# Patient Record
Sex: MALE | Race: WHITE | NOT HISPANIC OR LATINO | Employment: OTHER | URBAN - NONMETROPOLITAN AREA
[De-identification: names, ages, dates, MRNs, and addresses within clinical notes are randomized per-mention and may not be internally consistent; named-entity substitution may affect disease eponyms.]

---

## 2017-01-18 ENCOUNTER — TELEPHONE (OUTPATIENT)
Dept: CARDIOLOGY | Facility: CLINIC | Age: 77
End: 2017-01-18

## 2017-01-18 RX ORDER — CARVEDILOL 6.25 MG/1
TABLET ORAL
Qty: 60 TABLET | Refills: 0 | Status: SHIPPED | OUTPATIENT
Start: 2017-01-18 | End: 2017-02-17 | Stop reason: SDUPTHER

## 2017-01-18 RX ORDER — ATORVASTATIN CALCIUM 10 MG/1
TABLET, FILM COATED ORAL
Qty: 30 TABLET | Refills: 0 | Status: SHIPPED | OUTPATIENT
Start: 2017-01-18 | End: 2017-02-17 | Stop reason: SDUPTHER

## 2017-01-18 RX ORDER — PRASUGREL HCL 10 MG
TABLET ORAL
Qty: 30 TABLET | Refills: 0 | Status: SHIPPED | OUTPATIENT
Start: 2017-01-18 | End: 2017-02-17 | Stop reason: SDUPTHER

## 2017-01-18 NOTE — TELEPHONE ENCOUNTER
Received request from [email or web address] for atorvastatin 10mg daily, effient 10mg daily, coreg 6.25 mg bid. Reviewed chart on discharge note 12-21-16 effient and coreg was listed but no directions, what is your recommendations. Thank you.

## 2017-01-31 ENCOUNTER — TELEPHONE (OUTPATIENT)
Dept: CARDIOLOGY | Facility: CLINIC | Age: 77
End: 2017-01-31

## 2017-01-31 RX ORDER — ISOSORBIDE MONONITRATE 30 MG/1
30 TABLET, EXTENDED RELEASE ORAL DAILY
Qty: 30 TABLET | Refills: 1 | Status: SHIPPED | OUTPATIENT
Start: 2017-01-31 | End: 2017-02-17 | Stop reason: SDUPTHER

## 2017-01-31 NOTE — TELEPHONE ENCOUNTER
Received call from caregiver reports that patient is needing refills on imdur 30 mg daily sent into [facility] pharmacy. Script sent into [facility] pharmacy.

## 2017-02-02 ENCOUNTER — OFFICE VISIT (OUTPATIENT)
Dept: CARDIOLOGY | Facility: CLINIC | Age: 77
End: 2017-02-02

## 2017-02-02 VITALS
DIASTOLIC BLOOD PRESSURE: 72 MMHG | HEIGHT: 68 IN | BODY MASS INDEX: 32.74 KG/M2 | SYSTOLIC BLOOD PRESSURE: 120 MMHG | HEART RATE: 76 BPM | WEIGHT: 216 LBS

## 2017-02-02 DIAGNOSIS — R06.02 SHORTNESS OF BREATH: ICD-10-CM

## 2017-02-02 DIAGNOSIS — I25.10 CORONARY ARTERY DISEASE INVOLVING NATIVE CORONARY ARTERY OF NATIVE HEART WITHOUT ANGINA PECTORIS: Primary | ICD-10-CM

## 2017-02-02 DIAGNOSIS — I10 ESSENTIAL HYPERTENSION: ICD-10-CM

## 2017-02-02 DIAGNOSIS — R53.83 OTHER FATIGUE: ICD-10-CM

## 2017-02-02 DIAGNOSIS — J84.10 PULMONARY FIBROSIS (HCC): ICD-10-CM

## 2017-02-02 DIAGNOSIS — E78.49 OTHER HYPERLIPIDEMIA: ICD-10-CM

## 2017-02-02 PROBLEM — E78.5 HYPERLIPEMIA: Status: ACTIVE | Noted: 2017-02-02

## 2017-02-02 PROBLEM — K21.9 GERD (GASTROESOPHAGEAL REFLUX DISEASE): Status: ACTIVE | Noted: 2017-02-02

## 2017-02-02 PROBLEM — K22.70 BARRETT'S ESOPHAGUS: Status: ACTIVE | Noted: 2017-02-02

## 2017-02-02 PROCEDURE — 99213 OFFICE O/P EST LOW 20 MIN: CPT | Performed by: NURSE PRACTITIONER

## 2017-02-02 RX ORDER — ASPIRIN 81 MG/1
81 TABLET ORAL DAILY
COMMUNITY

## 2017-02-02 NOTE — PROGRESS NOTES
Chief Complaint   Patient presents with   • Hospital Follow-Up     Denies chest pain. Doesn't need refills at this time.    • Shortness of Breath     related to fibrosis of lung.    • Fatigue     States his fatigue may have improved slightly but still feeling pretty fatigued.        Cardiac Complaints  Shortness of breath      Subjective   Mandeep William is a 76 y.o. male with a history of pulmonary fibrosis, hypertension, and Marsh's esophagus. He was referred in November for increasing shortness of breath and cough.  Stress was advised to look for any ischemic burden and echo to look at his LV function and PA pressure.  Stress showed anterior infarct with christa-infarct ischemia, he was started on imdur with plans for a cath.  Cath showed 80-90% occlusion of the circumflex and 2 stents were placed.  The LAD was found to be chronically occluded but no intervention was able to be done as it could not be reached, medical intervention was advised.  He returns today for follow up after procedure and reports doing better.  Shortness of breath reported but he attributes to fibrotic lungs, he states no worse than prior.  He reports since the stenting the pressure in his chest has subsided. He continues to be tired but states he thinks it may have improved slightly. Labs he reports with PCP, he states all labs looked okay. No refills are needed at present.    Cardiac History  Past Surgical History   Procedure Laterality Date   • Echo - converted  12/01/2016     EF 60%   • Cardiovascular stress test  12/01/2016     [facility] Myoview- Anterior Infarct with Periinfarct Ischemia.   • Cardiac catheterization  12/19/2016     100% LAD. 80% OM2   • Cardiac catheterization  12/21/2016     Dr. Pollard- 3.0x18 & 3.0x9 Resolute stents in LCX. Unable to cross  of LAD       Current Outpatient Prescriptions   Medication Sig Dispense Refill   • albuterol (PROVENTIL) (2.5 MG/3ML) 0.083% nebulizer solution Take 2.5 mg by nebulization Every 4 (Four)  Hours As Needed for wheezing.     • aspirin 81 MG EC tablet Take 81 mg by mouth Daily.     • atorvastatin (LIPITOR) 10 MG tablet TAKE 1 TABLET BY MOUTH EVERY DAY 30 tablet 0   • carvedilol (COREG) 6.25 MG tablet TAKE 1 TABLET BY MOUTH TWICE DAILY 60 tablet 0   • EFFIENT 10 MG tablet TAKE 1 TABLET BY MOUTH ONCE DAILY 30 tablet 0   • isosorbide mononitrate (IMDUR) 30 MG 24 hr tablet Take 1 tablet by mouth Daily. 30 tablet 1   • losartan-hydrochlorothiazide (HYZAAR) 100-25 MG per tablet Take 1 tablet by mouth Daily.     • meloxicam (MOBIC) 15 MG tablet Take 15 mg by mouth Daily.     • omeprazole (priLOSEC) 20 MG capsule Take 20 mg by mouth Daily.     • tamsulosin (FLOMAX) 0.4 MG capsule 24 hr capsule Take 1 capsule by mouth Every Night.       No current facility-administered medications for this visit.        Review of patient's allergies indicates no known allergies.    Past Medical History   Diagnosis Date   • Marsh's esophagus    • Basal cell carcinoma of skin      surgical removal   • Diaphragmatic hernia    • Diverticulitis    • GERD (gastroesophageal reflux disease)    • Hypertension    • Kidney stones    • Prostatitis    • Pulmonary fibrosis        Social History     Social History   • Marital status: Unknown     Spouse name: N/A   • Number of children: N/A   • Years of education: N/A     Occupational History   • Not on file.     Social History Main Topics   • Smoking status: Never Smoker   • Smokeless tobacco: Never Used   • Alcohol use No   • Drug use: No   • Sexual activity: Not on file     Other Topics Concern   • Not on file     Social History Narrative       Family History   Problem Relation Age of Onset   • Stroke Mother        Review of Systems   Constitutional: Negative.    HENT: Negative.    Respiratory: Negative.    Cardiovascular: Negative.    Gastrointestinal: Negative.    Musculoskeletal: Negative.    Skin: Negative.    Neurological: Negative.    Psychiatric/Behavioral: Negative.   "      DiabetesNo  Thyroidnormal    Objective     Visit Vitals   • /72   • Pulse 76   • Ht 68\" (172.7 cm)   • Wt 216 lb (98 kg)   • BMI 32.84 kg/m2       Physical Exam   Constitutional: He is oriented to person, place, and time. He appears well-developed and well-nourished.   HENT:   Head: Normocephalic and atraumatic.   Eyes: EOM are normal. Pupils are equal, round, and reactive to light.   Neck: Normal range of motion.   Cardiovascular: Normal rate and regular rhythm.    Murmur heard.  Pulmonary/Chest: Effort normal and breath sounds normal.   Abdominal: Soft.   Musculoskeletal: Normal range of motion.   Neurological: He is alert and oriented to person, place, and time.   Skin: Skin is warm and dry.   Psychiatric: He has a normal mood and affect.       Procedures    Assessment/Plan     HR and BP are stable.  No changes to meds will be made.  No new cardiac testing advised as no new concerns are voiced. Patient continues to have some shortness of breath but he attributes to his pulmonary fibrosis and actually reports it as improved. Most recent cath did show chronic occlusion of LAD which could not be stented, patient advised to continue imdur therapy.  In regards to pulmonary fibrosis, he states he follows with pulmonary. Labs he reports with you, could we get next copy?  He does not need refills at this time.  Good cardiac diet with limited sodium intake advised.  6 month follow up scheduled or sooner if needed.        Problems Addressed this Visit        Cardiovascular and Mediastinum    CAD (coronary artery disease) - Primary    HTN (hypertension)    Hyperlipemia       Respiratory    Pulmonary fibrosis      Other Visit Diagnoses     Shortness of breath        Other fatigue                    Electronically signed by BRAULIO Kate February 2, 2017 3:41 PM        "

## 2017-02-17 ENCOUNTER — TELEPHONE (OUTPATIENT)
Dept: CARDIOLOGY | Facility: CLINIC | Age: 77
End: 2017-02-17

## 2017-02-17 RX ORDER — ISOSORBIDE MONONITRATE 30 MG/1
30 TABLET, EXTENDED RELEASE ORAL DAILY
Qty: 90 TABLET | Refills: 1 | Status: SHIPPED | OUTPATIENT
Start: 2017-02-17 | End: 2017-08-08 | Stop reason: SDUPTHER

## 2017-02-17 RX ORDER — PRASUGREL 10 MG/1
10 TABLET, FILM COATED ORAL DAILY
Qty: 90 TABLET | Refills: 1 | Status: SHIPPED | OUTPATIENT
Start: 2017-02-17 | End: 2017-02-17 | Stop reason: SDUPTHER

## 2017-02-17 RX ORDER — CARVEDILOL 6.25 MG/1
6.25 TABLET ORAL 2 TIMES DAILY WITH MEALS
Qty: 180 TABLET | Refills: 1 | Status: SHIPPED | OUTPATIENT
Start: 2017-02-17 | End: 2017-08-08 | Stop reason: SDUPTHER

## 2017-02-17 RX ORDER — ISOSORBIDE MONONITRATE 30 MG/1
30 TABLET, EXTENDED RELEASE ORAL DAILY
Qty: 90 TABLET | Refills: 1 | Status: SHIPPED | OUTPATIENT
Start: 2017-02-17 | End: 2017-02-17 | Stop reason: SDUPTHER

## 2017-02-17 RX ORDER — ATORVASTATIN CALCIUM 10 MG/1
10 TABLET, FILM COATED ORAL DAILY
Qty: 90 TABLET | Refills: 1 | Status: SHIPPED | OUTPATIENT
Start: 2017-02-17 | End: 2017-02-17 | Stop reason: SDUPTHER

## 2017-02-17 RX ORDER — ATORVASTATIN CALCIUM 10 MG/1
10 TABLET, FILM COATED ORAL DAILY
Qty: 90 TABLET | Refills: 1 | Status: SHIPPED | OUTPATIENT
Start: 2017-02-17 | End: 2017-07-31 | Stop reason: SDUPTHER

## 2017-02-17 RX ORDER — CARVEDILOL 6.25 MG/1
6.25 TABLET ORAL 2 TIMES DAILY WITH MEALS
Qty: 180 TABLET | Refills: 1 | Status: SHIPPED | OUTPATIENT
Start: 2017-02-17 | End: 2017-02-17 | Stop reason: SDUPTHER

## 2017-02-17 RX ORDER — PRASUGREL 10 MG/1
10 TABLET, FILM COATED ORAL DAILY
Qty: 90 TABLET | Refills: 1 | Status: SHIPPED | OUTPATIENT
Start: 2017-02-17 | End: 2017-05-16

## 2017-02-17 NOTE — TELEPHONE ENCOUNTER
Patient called requesting refills on Atorvastatin 10mg daily, Coreg 6.25mg bid, Effient 10mg daily, and Imdur 30mg daily be sent to [place], [place]. Refill on Atorvastatin 10mg, Coreg 6.25mg, Effient 10mg, and Imdur 30mg sent to pharmacy.

## 2017-05-12 ENCOUNTER — TELEPHONE (OUTPATIENT)
Dept: CARDIOLOGY | Facility: CLINIC | Age: 77
End: 2017-05-12

## 2017-05-16 RX ORDER — CLOPIDOGREL BISULFATE 75 MG/1
75 TABLET ORAL DAILY
Qty: 90 TABLET | Refills: 3 | Status: SHIPPED | OUTPATIENT
Start: 2017-05-16 | End: 2018-06-04 | Stop reason: SDUPTHER

## 2017-07-05 RX ORDER — ATORVASTATIN CALCIUM 10 MG/1
TABLET, FILM COATED ORAL
Qty: 90 TABLET | Refills: 0 | OUTPATIENT
Start: 2017-07-05

## 2017-07-31 RX ORDER — ATORVASTATIN CALCIUM 10 MG/1
TABLET, FILM COATED ORAL
Qty: 90 TABLET | Refills: 3 | Status: SHIPPED | OUTPATIENT
Start: 2017-07-31 | End: 2018-02-09 | Stop reason: SDUPTHER

## 2017-08-08 ENCOUNTER — TELEPHONE (OUTPATIENT)
Dept: CARDIOLOGY | Facility: CLINIC | Age: 77
End: 2017-08-08

## 2017-08-08 ENCOUNTER — OFFICE VISIT (OUTPATIENT)
Dept: CARDIOLOGY | Facility: CLINIC | Age: 77
End: 2017-08-08

## 2017-08-08 VITALS
WEIGHT: 214 LBS | HEIGHT: 68 IN | SYSTOLIC BLOOD PRESSURE: 122 MMHG | HEART RATE: 68 BPM | BODY MASS INDEX: 32.43 KG/M2 | DIASTOLIC BLOOD PRESSURE: 70 MMHG

## 2017-08-08 DIAGNOSIS — E78.49 OTHER HYPERLIPIDEMIA: ICD-10-CM

## 2017-08-08 DIAGNOSIS — K21.9 GASTROESOPHAGEAL REFLUX DISEASE WITHOUT ESOPHAGITIS: ICD-10-CM

## 2017-08-08 DIAGNOSIS — I25.10 CORONARY ARTERY DISEASE INVOLVING NATIVE CORONARY ARTERY OF NATIVE HEART WITHOUT ANGINA PECTORIS: Primary | ICD-10-CM

## 2017-08-08 DIAGNOSIS — J84.10 PULMONARY FIBROSIS (HCC): ICD-10-CM

## 2017-08-08 DIAGNOSIS — I10 ESSENTIAL HYPERTENSION: ICD-10-CM

## 2017-08-08 PROCEDURE — 99214 OFFICE O/P EST MOD 30 MIN: CPT | Performed by: NURSE PRACTITIONER

## 2017-08-08 RX ORDER — CARVEDILOL 6.25 MG/1
6.25 TABLET ORAL 2 TIMES DAILY WITH MEALS
Qty: 180 TABLET | Refills: 2 | Status: SHIPPED | OUTPATIENT
Start: 2017-08-08 | End: 2018-08-14 | Stop reason: SDUPTHER

## 2017-08-08 RX ORDER — ISOSORBIDE MONONITRATE 30 MG/1
30 TABLET, EXTENDED RELEASE ORAL DAILY
Qty: 90 TABLET | Refills: 2 | Status: SHIPPED | OUTPATIENT
Start: 2017-08-08 | End: 2018-08-14 | Stop reason: SDUPTHER

## 2017-08-08 NOTE — TELEPHONE ENCOUNTER
This gentlemen was in today and had questions in regards to his LAD.  On his cath it states that his LAD was deemed to be chronically occluded, and it could not be reached for intervention.  He is wondering if he can be seen by someone in [place] who may possibly be able to do an intervention.  I advised him I would discuss with you and get back to him.  Thank you.

## 2017-08-08 NOTE — PROGRESS NOTES
"Chief Complaint   Patient presents with   • Follow-up     He states continues to stay weak all the time. Last labs in chart.   • Shortness of Breath     He reports about the same as before.    • Med Refill     Needs refills on Isosorbide Mononitrate and Carvedilol -90 day.       Cardiac Complaints  dyspnea and fatigue      Subjective   Mandeep William is a 76 y.o. male with a history of pulmonary fibrosis, hypertension, and Marsh's esophagus. He was referred in November for increasing shortness of breath and cough.  Stress was advised to look for any ischemic burden and echo to look at his LV function and PA pressure.  Stress showed anterior infarct with christa-infarct ischemia, he was started on imdur with plans for a cath.  Cath showed 80-90% occlusion of the circumflex and 2 stents were placed.  The LAD was found to be chronically occluded but no intervention was able to be done as it could not be reached, medical intervention was advised. He returns today for follow up and denies any new concerns. He does continue to have issues with fatigue and shortness of breath but states this has been the same for sometime.  He is wanting to discuss possible opening of the LAD with someone in [place].  He says he feels like that vessel is making him weak and tired since \"it is clogged.\"  He continues to follow with Dr. Powers in regards to pulmonary fibrosis and he has not seen her for sometime.  He was also following with Dr. Zambrano for a kidney stone which he reports was recently passed.  Labs he states with PCP, he reports most recent looked okay.  He is requesting refills of imdur and coreg be sent to the pharmacy.        Cardiac History  Past Surgical History:   Procedure Laterality Date   • CARDIAC CATHETERIZATION  12/19/2016    100% LAD. 80% OM2   • CARDIAC CATHETERIZATION  12/21/2016    Dr. Pollard- 3.0x18 & 3.0x9 Resolute stents in LCX. Unable to cross  of LAD   • CARDIOVASCULAR STRESS TEST  12/01/2016    [facility] " Myoview- Anterior Infarct with Periinfarct Ischemia.   • ECHO - CONVERTED  12/01/2016    EF 60%       Current Outpatient Prescriptions   Medication Sig Dispense Refill   • albuterol (PROVENTIL) (2.5 MG/3ML) 0.083% nebulizer solution Take 2.5 mg by nebulization Every 4 (Four) Hours As Needed for wheezing.     • aspirin 81 MG EC tablet Take 81 mg by mouth Daily.     • atorvastatin (LIPITOR) 10 MG tablet TAKE 1 TABLET BY MOUTH DAILY 90 tablet 3   • carvedilol (COREG) 6.25 MG tablet Take 1 tablet by mouth 2 (Two) Times a Day With Meals. 180 tablet 2   • clopidogrel (PLAVIX) 75 MG tablet Take 1 tablet by mouth Daily. 90 tablet 3   • isosorbide mononitrate (IMDUR) 30 MG 24 hr tablet Take 1 tablet by mouth Daily. 90 tablet 2   • losartan-hydrochlorothiazide (HYZAAR) 100-25 MG per tablet Take 1 tablet by mouth Daily.     • omeprazole (priLOSEC) 20 MG capsule Take 20 mg by mouth Daily.     • tamsulosin (FLOMAX) 0.4 MG capsule 24 hr capsule Take 1 capsule by mouth Every Night.       No current facility-administered medications for this visit.        Review of patient's allergies indicates no known allergies.    Past Medical History:   Diagnosis Date   • Marsh's esophagus    • Basal cell carcinoma of skin     surgical removal   • Diaphragmatic hernia    • Diverticulitis    • GERD (gastroesophageal reflux disease)    • Hypertension    • Kidney stones    • Prostatitis    • Pulmonary fibrosis        Social History     Social History   • Marital status: Unknown     Spouse name: N/A   • Number of children: N/A   • Years of education: N/A     Occupational History   • Not on file.     Social History Main Topics   • Smoking status: Never Smoker   • Smokeless tobacco: Never Used   • Alcohol use No   • Drug use: No   • Sexual activity: Not on file     Other Topics Concern   • Not on file     Social History Narrative       Family History   Problem Relation Age of Onset   • Stroke Mother        Review of Systems   Constitution: Positive  "for weakness and malaise/fatigue.   Cardiovascular: Positive for dyspnea on exertion. Negative for chest pain, irregular heartbeat, leg swelling and palpitations.   Respiratory: Positive for shortness of breath. Negative for wheezing.    Gastrointestinal: Negative for anorexia, heartburn and nausea.   Genitourinary: Negative for frequency, hesitancy and nocturia.   Neurological: Negative for dizziness, focal weakness and light-headedness.   Psychiatric/Behavioral: Negative for altered mental status and depression.       DiabetesNo  Thyroidnormal    Objective     /70 (BP Location: Left arm)  Pulse 68  Ht 68\" (172.7 cm)  Wt 214 lb (97.1 kg)  BMI 32.54 kg/m2    Physical Exam   Constitutional: He is oriented to person, place, and time. He appears well-developed and well-nourished.   HENT:   Head: Normocephalic and atraumatic.   Eyes: EOM are normal. Pupils are equal, round, and reactive to light.   Neck: Normal range of motion.   Cardiovascular: Normal rate and regular rhythm.    Pulmonary/Chest: Effort normal. He has decreased breath sounds in the right lower field and the left lower field.   Abdominal: Soft.   Musculoskeletal: Normal range of motion.   Neurological: He is alert and oriented to person, place, and time.   Skin: Skin is warm and dry.   Psychiatric: He has a normal mood and affect.       Procedures    Assessment/Plan     HR and BP are both stable.  No changes to current regimen will be made.  We will advise to continue both coreg and imdur will be continued.  Discussion with Dr. Smith will be had in regards to possible referral to [place] for possible opening of chronically occluded LAD per patient request as he thinks this is attributing to his shortness of breath and fatigue.  We will call patient to discuss after consulting with the doctor.  Labs he reports with you, most recent showed LDL of 39, he states you are following and unsure if his lipitor dosing was adjusted. No significant " electrolyte imbalance seen. In regards to pulmonary management, he continues to follow with Dr. Powers.  Refills of imdur and coreg sent per request.  BMI is elevated at 32.54, he was encouraged on good cardiac diet with limited carbs/sugars.  Patient does admit to walking on treadmill and was advised to increase his regimen to at least 4 days a week about 20-30 minutes each time. 6 month follow up will be scheduled or sooner if needed.        Problems Addressed this Visit        Cardiovascular and Mediastinum    CAD (coronary artery disease) - Primary    Relevant Medications    isosorbide mononitrate (IMDUR) 30 MG 24 hr tablet    carvedilol (COREG) 6.25 MG tablet    HTN (hypertension)    Relevant Medications    carvedilol (COREG) 6.25 MG tablet    Hyperlipemia       Respiratory    Pulmonary fibrosis       Digestive    GERD (gastroesophageal reflux disease)              Electronically signed by BRAULIO Kate August 8, 2017 11:41 AM

## 2017-08-08 NOTE — TELEPHONE ENCOUNTER
T had said if this holden continued to have chest pain we could refer. His symptoms have not been chest pain to this point.  Always just fatigue and shortness of breath which has been persistent.  We can assess with him if he would like to proceed with Dr. Philip.

## 2017-08-09 NOTE — TELEPHONE ENCOUNTER
I spoke with patient regarding recommendations.  He would like to think about it.  He will call us back if he decides to proceed with referral to Dr. Chico Philip.

## 2017-08-28 ENCOUNTER — TELEPHONE (OUTPATIENT)
Dept: CARDIOLOGY | Facility: CLINIC | Age: 77
End: 2017-08-28

## 2017-08-28 DIAGNOSIS — I25.118 ATHEROSCLEROSIS OF NATIVE CORONARY ARTERY OF NATIVE HEART WITH OTHER FORM OF ANGINA PECTORIS (HCC): Primary | ICD-10-CM

## 2017-08-30 ENCOUNTER — HOSPITAL ENCOUNTER (OUTPATIENT)
Dept: CARDIOLOGY | Facility: HOSPITAL | Age: 77
Discharge: HOME | End: 2017-08-30
Attending: INTERNAL MEDICINE

## 2017-08-30 DIAGNOSIS — Z98.890 HISTORY OF LEFT HEART CATHETERIZATION (LHC): ICD-10-CM

## 2017-09-14 ENCOUNTER — TELEPHONE (OUTPATIENT)
Dept: CARDIOLOGY | Facility: CLINIC | Age: 77
End: 2017-09-14

## 2017-09-14 DIAGNOSIS — I25.6 SILENT MYOCARDIAL ISCHEMIA: ICD-10-CM

## 2017-09-14 DIAGNOSIS — I99.8 SILENT ISCHEMIA: ICD-10-CM

## 2017-09-14 DIAGNOSIS — I25.9 IHD (ISCHEMIC HEART DISEASE): Primary | ICD-10-CM

## 2017-09-26 ENCOUNTER — HOSPITAL ENCOUNTER (OUTPATIENT)
Dept: CARDIOLOGY | Facility: HOSPITAL | Age: 77
Discharge: HOME | End: 2017-09-26
Attending: INTERNAL MEDICINE

## 2017-09-26 ENCOUNTER — OUTSIDE FACILITY SERVICE (OUTPATIENT)
Dept: CARDIOLOGY | Facility: CLINIC | Age: 77
End: 2017-09-26

## 2017-09-26 DIAGNOSIS — I25.6 SILENT MYOCARDIAL ISCHEMIA: ICD-10-CM

## 2017-09-26 DIAGNOSIS — I25.9 IHD (ISCHEMIC HEART DISEASE): ICD-10-CM

## 2017-09-26 LAB
MAXIMAL PREDICTED HEART RATE: 145 BPM
STRESS TARGET HR: 123 BPM

## 2017-09-26 PROCEDURE — 93017 CV STRESS TEST TRACING ONLY: CPT

## 2017-09-26 PROCEDURE — 0 TECHNETIUM SESTAMIBI: Performed by: INTERNAL MEDICINE

## 2017-09-26 PROCEDURE — 78452 HT MUSCLE IMAGE SPECT MULT: CPT

## 2017-09-26 PROCEDURE — 25010000002 REGADENOSON 0.4 MG/5ML SOLUTION: Performed by: INTERNAL MEDICINE

## 2017-09-26 PROCEDURE — 93018 CV STRESS TEST I&R ONLY: CPT | Performed by: INTERNAL MEDICINE

## 2017-09-26 PROCEDURE — A9500 TC99M SESTAMIBI: HCPCS | Performed by: INTERNAL MEDICINE

## 2017-09-26 PROCEDURE — 78452 HT MUSCLE IMAGE SPECT MULT: CPT | Performed by: INTERNAL MEDICINE

## 2017-09-26 RX ADMIN — TECHNETIUM TC-99M SESTAMIBI 1 DOSE: 1 INJECTION INTRAVENOUS at 10:15

## 2017-09-26 RX ADMIN — REGADENOSON 0.4 MG: 0.08 INJECTION, SOLUTION INTRAVENOUS at 10:15

## 2017-09-28 ENCOUNTER — TELEPHONE (OUTPATIENT)
Dept: CARDIOLOGY | Facility: CLINIC | Age: 77
End: 2017-09-28

## 2017-10-02 DIAGNOSIS — I25.10 CORONARY ARTERY DISEASE INVOLVING NATIVE CORONARY ARTERY OF NATIVE HEART, ANGINA PRESENCE UNSPECIFIED: Primary | ICD-10-CM

## 2017-10-03 PROBLEM — I25.10 CORONARY ARTERY DISEASE INVOLVING NATIVE CORONARY ARTERY OF NATIVE HEART: Status: ACTIVE | Noted: 2017-10-03

## 2017-10-09 ENCOUNTER — PREP FOR SURGERY (OUTPATIENT)
Dept: OTHER | Facility: HOSPITAL | Age: 77
End: 2017-10-09

## 2017-10-09 RX ORDER — ASPIRIN 81 MG/1
81 TABLET ORAL DAILY
Status: CANCELLED | OUTPATIENT
Start: 2017-10-10

## 2017-10-09 RX ORDER — NITROGLYCERIN 0.4 MG/1
0.4 TABLET SUBLINGUAL
Status: CANCELLED | OUTPATIENT
Start: 2017-10-09

## 2017-10-09 RX ORDER — ASPIRIN 81 MG/1
324 TABLET, CHEWABLE ORAL ONCE
Status: CANCELLED | OUTPATIENT
Start: 2017-10-09 | End: 2017-10-09

## 2017-10-09 RX ORDER — SODIUM CHLORIDE 0.9 % (FLUSH) 0.9 %
1-10 SYRINGE (ML) INJECTION AS NEEDED
Status: CANCELLED | OUTPATIENT
Start: 2017-10-09

## 2017-10-09 RX ORDER — ACETAMINOPHEN 325 MG/1
650 TABLET ORAL EVERY 4 HOURS PRN
Status: CANCELLED | OUTPATIENT
Start: 2017-10-09

## 2017-10-11 ENCOUNTER — APPOINTMENT (OUTPATIENT)
Dept: GENERAL RADIOLOGY | Facility: HOSPITAL | Age: 77
End: 2017-10-11

## 2017-10-11 ENCOUNTER — HOSPITAL ENCOUNTER (OUTPATIENT)
Facility: HOSPITAL | Age: 77
Setting detail: HOSPITAL OUTPATIENT SURGERY
Discharge: HOME | End: 2017-10-11
Attending: INTERNAL MEDICINE | Admitting: INTERNAL MEDICINE

## 2017-10-11 VITALS
RESPIRATION RATE: 14 BRPM | WEIGHT: 207.23 LBS | OXYGEN SATURATION: 97 % | SYSTOLIC BLOOD PRESSURE: 130 MMHG | BODY MASS INDEX: 31.41 KG/M2 | DIASTOLIC BLOOD PRESSURE: 78 MMHG | TEMPERATURE: 98 F | HEIGHT: 68 IN | HEART RATE: 65 BPM

## 2017-10-11 DIAGNOSIS — I25.10 CORONARY ARTERY DISEASE INVOLVING NATIVE CORONARY ARTERY OF NATIVE HEART, ANGINA PRESENCE UNSPECIFIED: ICD-10-CM

## 2017-10-11 LAB
ALBUMIN SERPL-MCNC: 3.9 G/DL (ref 3.2–4.8)
ALBUMIN/GLOB SERPL: 1.2 G/DL (ref 1.5–2.5)
ALP SERPL-CCNC: 120 U/L (ref 25–100)
ALT SERPL W P-5'-P-CCNC: 16 U/L (ref 7–40)
ANION GAP SERPL CALCULATED.3IONS-SCNC: 8 MMOL/L (ref 3–11)
ARTICHOKE IGE QN: 43 MG/DL (ref 0–130)
AST SERPL-CCNC: 17 U/L (ref 0–33)
BILIRUB SERPL-MCNC: 0.9 MG/DL (ref 0.3–1.2)
BUN BLD-MCNC: 18 MG/DL (ref 9–23)
BUN/CREAT SERPL: 15 (ref 7–25)
CALCIUM SPEC-SCNC: 9.3 MG/DL (ref 8.7–10.4)
CHLORIDE SERPL-SCNC: 104 MMOL/L (ref 99–109)
CHOLEST SERPL-MCNC: 94 MG/DL (ref 0–200)
CO2 SERPL-SCNC: 27 MMOL/L (ref 20–31)
CREAT BLD-MCNC: 1.2 MG/DL (ref 0.6–1.3)
DEPRECATED RDW RBC AUTO: 47.6 FL (ref 37–54)
ERYTHROCYTE [DISTWIDTH] IN BLOOD BY AUTOMATED COUNT: 13.8 % (ref 11.3–14.5)
GFR SERPL CREATININE-BSD FRML MDRD: 59 ML/MIN/1.73
GLOBULIN UR ELPH-MCNC: 3.2 GM/DL
GLUCOSE BLD-MCNC: 126 MG/DL (ref 70–100)
HBA1C MFR BLD: 5.7 % (ref 4.8–5.6)
HCT VFR BLD AUTO: 37.9 % (ref 38.9–50.9)
HDLC SERPL-MCNC: 36 MG/DL (ref 40–60)
HGB BLD-MCNC: 12.4 G/DL (ref 13.1–17.5)
MCH RBC QN AUTO: 30.8 PG (ref 27–31)
MCHC RBC AUTO-ENTMCNC: 32.7 G/DL (ref 32–36)
MCV RBC AUTO: 94.3 FL (ref 80–99)
PLATELET # BLD AUTO: 104 10*3/MM3 (ref 150–450)
PMV BLD AUTO: 12 FL (ref 6–12)
POTASSIUM BLD-SCNC: 3.6 MMOL/L (ref 3.5–5.5)
PROT SERPL-MCNC: 7.1 G/DL (ref 5.7–8.2)
RBC # BLD AUTO: 4.02 10*6/MM3 (ref 4.2–5.76)
SODIUM BLD-SCNC: 139 MMOL/L (ref 132–146)
TRIGL SERPL-MCNC: 98 MG/DL (ref 0–150)
WBC NRBC COR # BLD: 7.54 10*3/MM3 (ref 3.5–10.8)

## 2017-10-11 PROCEDURE — 80061 LIPID PANEL: CPT | Performed by: PHYSICIAN ASSISTANT

## 2017-10-11 PROCEDURE — 85027 COMPLETE CBC AUTOMATED: CPT | Performed by: PHYSICIAN ASSISTANT

## 2017-10-11 PROCEDURE — 93005 ELECTROCARDIOGRAM TRACING: CPT | Performed by: PHYSICIAN ASSISTANT

## 2017-10-11 PROCEDURE — 83036 HEMOGLOBIN GLYCOSYLATED A1C: CPT | Performed by: PHYSICIAN ASSISTANT

## 2017-10-11 PROCEDURE — 80053 COMPREHEN METABOLIC PANEL: CPT | Performed by: PHYSICIAN ASSISTANT

## 2017-10-11 PROCEDURE — S0260 H&P FOR SURGERY: HCPCS | Performed by: INTERNAL MEDICINE

## 2017-10-11 PROCEDURE — 93010 ELECTROCARDIOGRAM REPORT: CPT | Performed by: INTERNAL MEDICINE

## 2017-10-11 PROCEDURE — 36415 COLL VENOUS BLD VENIPUNCTURE: CPT

## 2017-10-11 PROCEDURE — 71010 HC CHEST PA OR AP: CPT

## 2017-10-11 RX ORDER — ASPIRIN 81 MG/1
324 TABLET, CHEWABLE ORAL ONCE
Status: COMPLETED | OUTPATIENT
Start: 2017-10-11 | End: 2017-10-11

## 2017-10-11 RX ORDER — ASPIRIN 81 MG/1
81 TABLET ORAL DAILY
Status: DISCONTINUED | OUTPATIENT
Start: 2017-10-12 | End: 2017-10-11 | Stop reason: HOSPADM

## 2017-10-11 RX ORDER — ACETAMINOPHEN 325 MG/1
650 TABLET ORAL EVERY 4 HOURS PRN
Status: DISCONTINUED | OUTPATIENT
Start: 2017-10-11 | End: 2017-10-11 | Stop reason: HOSPADM

## 2017-10-11 RX ORDER — NITROGLYCERIN 0.4 MG/1
0.4 TABLET SUBLINGUAL
Status: DISCONTINUED | OUTPATIENT
Start: 2017-10-11 | End: 2017-10-11 | Stop reason: HOSPADM

## 2017-10-11 RX ORDER — SODIUM CHLORIDE 0.9 % (FLUSH) 0.9 %
1-10 SYRINGE (ML) INJECTION AS NEEDED
Status: DISCONTINUED | OUTPATIENT
Start: 2017-10-11 | End: 2017-10-11 | Stop reason: HOSPADM

## 2017-10-11 RX ADMIN — ASPIRIN 81 MG 243 MG: 81 TABLET ORAL at 09:48

## 2017-10-11 NOTE — H&P
"    Pre-cardiac Catheterization History and Physical  [facility]      Patient:  Mandeep William  :  1940  MRN: [id number]    PCP:  Darwin Kimball MD  PHONE:  [phone]  Cardiologist: LESIA Smith MD  Pulmonologist: Dr. Tami Zimmer    DATE: 10/11/2017  ID: Mandeep William is a 76 y.o. male resident of [place], [place]     CC: Abnormal stress test    PROBLEM LIST:   1. Coronary artery disease  A. LHC 2016 OSH:  of LAD, 85% stenosis in LCX prior to OM branch, non obstructive RCA, Normal LVEF  B. Staged intervention of LCX with placement of 2 Resolute stents in LCX, unable to cross  of LAD  C. Echo : EF 55-60%, mild MR, mild PAH, mild LVH  D. Lexiscan MPS 2017: anterior wall infarct with christa-infarct ischemia, normal LV function   2. Hypertension   3. Hyperlipidemia  5. Pulmonary fibrosis  6. GERD/Barett's esophagus   7. Prostatitis  8. History of nephrolithiasis     BRIEF HPI: Mr. William is a pleasant 77 y/o WM with history as noted above. He presented in December with symptoms of chest pressure and CUETO and underwent a heart cath at OSH which revealed  of LAD and disease in the circumflex, with normal LV function. He underwent stent placement x2 to his circumflex, however his LAD was not able to be crossed. He has continued to have symptoms of CUETO, however he notes these are stable compared to his previous dyspnea. He is followed by Dr. Zimmer for his pulmonary fibrosis. His chest pressure has resolved post intervention, however he complains of feeling \"weak and tired all the time.\" He therefore underwent MPS which was abnormal and he was referred for [facility] today with intervention standby. He denies history of MI, CVA, DVT/PE or rheumatic fever. No tobacco, alcohol or drug use.     Cardiac Risk Factors: known CAD, advanced age (older than 55 for men, 65 for women), dyslipidemia, hypertension, male gender and sedentary lifestyle    Allergies:      No " "Known Allergies    MEDICATIONS:    Current Outpatient Prescriptions on File Prior to Encounter   Medication Sig   • albuterol (PROVENTIL) (2.5 MG/3ML) 0.083% nebulizer solution Take 2.5 mg by nebulization Every 4 (Four) Hours As Needed for wheezing.   • aspirin 81 MG EC tablet Take 81 mg by mouth twice daily    • atorvastatin (LIPITOR) 10 MG tablet TAKE 1 TABLET BY MOUTH DAILY   • carvedilol (COREG) 6.25 MG tablet Take 1 tablet by mouth 2 (Two) Times a Day With Meals.   • clopidogrel (PLAVIX) 75 MG tablet Take 1 tablet by mouth Daily.   • isosorbide mononitrate (IMDUR) 30 MG 24 hr tablet Take 1 tablet by mouth Daily.   • losartan-hydrochlorothiazide (HYZAAR) 100-25 MG per tablet Take 1 tablet by mouth Daily.   • omeprazole (priLOSEC) 20 MG capsule Take 20 mg by mouth Daily.   • tamsulosin (FLOMAX) 0.4 MG capsule 24 hr capsule Take 1 capsule by mouth Every Night.     Past medical & surgical history, social and family history reviewed in the electronic medical record.    ROS: Pertinent positives listed in the HPI and problem list above. All others reviewed and negative.     Physical Exam:   /78 (BP Location: Left arm, Patient Position: Lying)  Pulse 65  Temp 98 °F (36.7 °C) (Temporal Artery )   Resp 14  Ht 68\" (172.7 cm)  Wt 207 lb 3.7 oz (94 kg)  SpO2 97%  BMI 31.51 kg/m2    Constitutional:    Alert, cooperative, in no acute distress   Neck:     No Jugular venous distention, adenopathy, or thyromegaly noted. There are no carotid bruits.    Heart:    Regular rhythm and normal rate, normal S1 and S2, no murmurs,gallops, rubs, or clicks. No distinct PMI noted.    Lungs:     Clear to auscultation bilaterally, respirations regular, even     and unlabored    Abdomen:     Soft non-tender, non-distended, normal bowel sounds, no masses or organomegaly   Extremities:   No gross deformities, no edema, clubbing, or cyanosis.    Pulses:   Peripheral pulses palpable and equal bilaterally.     Barbaeu Test:  Left: " Normal  (oxymetric Allens) Right: Not Assessed     Labs and Diagnostic Data:    Results from last 7 days  Lab Units 10/11/17  0900   SODIUM mmol/L 139   POTASSIUM mmol/L 3.6   CHLORIDE mmol/L 104   CO2 mmol/L 27.0   BUN mg/dL 18   CREATININE mg/dL 1.20   GLUCOSE mg/dL 126*   CALCIUM mg/dL 9.3       Results from last 7 days  Lab Units 10/11/17  0900   WBC 10*3/mm3 7.54   HEMOGLOBIN g/dL 12.4*   HEMATOCRIT % 37.9*   PLATELETS 10*3/mm3 104*     Lab Results   Component Value Date    CHOL 94 10/11/2017    TRIG 98 10/11/2017    HDL 36 (L) 10/11/2017    LDLDIRECT 43 10/11/2017    AST 17 10/11/2017    ALT 16 10/11/2017                   EKG: SB at 58bpm    IMPRESSION:  · 77 y/o WM with history of CAD with recent cath demonstrating  of LAD. He underwent stress MPS which showed anterior infarct with mild christa-infarct ischemia and normal LVEF. He was referred for catheterization study with intervention standby today.     PLAN:  · Procedure to perform: LHC +/- CBI. Risks, benefits and alternatives to the procedure explained to the patient and he understands and wishes to proceed. He has taken ASA and Plavix this am  · My concerns are that he was scheduled as an afternoon elective case (and I don't have all the backup and all the wires, etc.that I need) AND that I need to review Cardiolite (not on computer) as report suggests ? Infarcted area.  Long talk with patient and his family. His symptoms may well be due to lung disease and if so I don't want to risk procedural complications. All understand and are agreeable.    I, Chico Philip MD, personally performed the services described as documented by the above named individual. I have made any necessary edits and it is both accurate and complete 10/11/2017  1:41 PM    Scribed for Chico Philip MD by Karyna Voss PA-C. 10/11/2017  9:50 AM

## 2017-10-25 DIAGNOSIS — I25.82 CHRONIC TOTAL OCCLUSION OF CORONARY ARTERY: ICD-10-CM

## 2017-10-25 DIAGNOSIS — R94.39 ABNORMAL CARDIOVASCULAR STRESS TEST: ICD-10-CM

## 2017-10-25 DIAGNOSIS — I25.119 CORONARY ARTERY DISEASE INVOLVING NATIVE CORONARY ARTERY OF NATIVE HEART WITH ANGINA PECTORIS (HCC): Primary | ICD-10-CM

## 2017-10-25 NOTE — PROGRESS NOTES
After review of nuclear images Dr. Philip would like cardiac MRI. Daughter, Minoo velasco. Call her to schedule.  Work - [phone]  Cell - [phone]

## 2017-11-22 ENCOUNTER — TELEPHONE (OUTPATIENT)
Dept: CARDIOLOGY | Facility: CLINIC | Age: 77
End: 2017-11-22

## 2017-11-22 NOTE — TELEPHONE ENCOUNTER
JOE spoke with Dr. Smith and Daughter, Anjali via telephone. MRI reveals scar in the area of LAD distribution. Attempt at  intervention deferred due to unlikely benefit and high risk of complications.

## 2018-02-08 ENCOUNTER — TELEPHONE (OUTPATIENT)
Dept: CARDIOLOGY | Facility: CLINIC | Age: 78
End: 2018-02-08

## 2018-02-08 NOTE — TELEPHONE ENCOUNTER
Received fax from Dr. Gutierrez for cardiac clearance for patient to have teeth extracted. Patient is on plavix and aspirin. They are wanting to know how long he can hold aspirin and plavix and when he should start back?    Fax [phone]  Phone [phone]

## 2018-02-09 NOTE — TELEPHONE ENCOUNTER
Patient did not have cath. He had cardiac MRI done at  per Dr. Philip. Report from that is in chart under media from 11/09/17.

## 2018-02-12 RX ORDER — ATORVASTATIN CALCIUM 10 MG/1
TABLET, FILM COATED ORAL
Qty: 90 TABLET | Refills: 1 | Status: SHIPPED | OUTPATIENT
Start: 2018-02-12 | End: 2018-08-14 | Stop reason: SDUPTHER

## 2018-03-05 ENCOUNTER — OFFICE VISIT (OUTPATIENT)
Dept: CARDIOLOGY | Facility: CLINIC | Age: 78
End: 2018-03-05

## 2018-03-05 VITALS
WEIGHT: 218 LBS | HEIGHT: 68 IN | SYSTOLIC BLOOD PRESSURE: 122 MMHG | BODY MASS INDEX: 33.04 KG/M2 | DIASTOLIC BLOOD PRESSURE: 80 MMHG | HEART RATE: 76 BPM

## 2018-03-05 DIAGNOSIS — K22.719 BARRETT'S ESOPHAGUS WITH DYSPLASIA: ICD-10-CM

## 2018-03-05 DIAGNOSIS — I10 ESSENTIAL HYPERTENSION: ICD-10-CM

## 2018-03-05 DIAGNOSIS — Z79.899 MEDICATION MANAGEMENT: ICD-10-CM

## 2018-03-05 DIAGNOSIS — E78.00 PURE HYPERCHOLESTEROLEMIA: ICD-10-CM

## 2018-03-05 DIAGNOSIS — I25.118 CORONARY ARTERY DISEASE INVOLVING NATIVE CORONARY ARTERY OF NATIVE HEART WITH OTHER FORM OF ANGINA PECTORIS (HCC): Primary | ICD-10-CM

## 2018-03-05 PROCEDURE — 99213 OFFICE O/P EST LOW 20 MIN: CPT | Performed by: NURSE PRACTITIONER

## 2018-03-05 NOTE — PATIENT INSTRUCTIONS
Isosorbide Mononitrate extended-release tablets  What is this medicine?  ISOSORBIDE MONONITRATE (eye rob SOR bide mon oh GALINDO trate) is a vasodilator. It relaxes blood vessels, increasing the blood and oxygen supply to your heart. This medicine is used to prevent chest pain caused by angina. It will not help to stop an episode of chest pain.  This medicine may be used for other purposes; ask your health care provider or pharmacist if you have questions.  COMMON BRAND NAME(S): Imdur, Isotrate ER  What should I tell my health care provider before I take this medicine?  They need to know if you have any of these conditions:  -previous heart attack or heart failure  -an unusual or allergic reaction to isosorbide mononitrate, nitrates, other medicines, foods, dyes, or preservatives  -pregnant or trying to get pregnant  -breast-feeding  How should I use this medicine?  Take this medicine by mouth with a glass of water. Follow the directions on the prescription label. Do not crush or chew. Take your medicine at regular intervals. Do not take your medicine more often than directed. Do not stop taking this medicine except on the advice of your doctor or health care professional.  Talk to your pediatrician regarding the use of this medicine in children. Special care may be needed.  Overdosage: If you think you have taken too much of this medicine contact a poison control center or emergency room at once.  NOTE: This medicine is only for you. Do not share this medicine with others.  What if I miss a dose?  If you miss a dose, take it as soon as you can. If it is almost time for your next dose, take only that dose. Do not take double or extra doses.  What may interact with this medicine?  Do not take this medicine with any of the following medications:  -medicines used to treat erectile dysfunction (ED) like avanafil, sildenafil, tadalafil, and vardenafil  -riociguat  This medicine may also interact with the following  medications:  -medicines for high blood pressure  -other medicines for angina or heart failure  This list may not describe all possible interactions. Give your health care provider a list of all the medicines, herbs, non-prescription drugs, or dietary supplements you use. Also tell them if you smoke, drink alcohol, or use illegal drugs. Some items may interact with your medicine.  What should I watch for while using this medicine?  Check your heart rate and blood pressure regularly while you are taking this medicine. Ask your doctor or health care professional what your heart rate and blood pressure should be and when you should contact him or her. Tell your doctor or health care professional if you feel your medicine is no longer working.  You may get dizzy. Do not drive, use machinery, or do anything that needs mental alertness until you know how this medicine affects you. To reduce the risk of dizzy or fainting spells, do not sit or stand up quickly, especially if you are an older patient. Alcohol can make you more dizzy, and increase flushing and rapid heartbeats. Avoid alcoholic drinks.  Do not treat yourself for coughs, colds, or pain while you are taking this medicine without asking your doctor or health care professional for advice. Some ingredients may increase your blood pressure.  What side effects may I notice from receiving this medicine?  Side effects that you should report to your doctor or health care professional as soon as possible:  -bluish discoloration of lips, fingernails, or palms of hands  -irregular heartbeat, palpitations  -low blood pressure  -nausea, vomiting  -persistent headache  -unusually weak or tired  Side effects that usually do not require medical attention (report to your doctor or health care professional if they continue or are bothersome):  -flushing of the face or neck  -rash  This list may not describe all possible side effects. Call your doctor for medical advice about side  effects. You may report side effects to FDA at 0-079-FDA-7070.  Where should I keep my medicine?  Keep out of the reach of children.  Store between 15 and 30 degrees C (59 and 86 degrees F). Keep container tightly closed. Throw away any unused medicine after the expiration date.  NOTE: This sheet is a summary. It may not cover all possible information. If you have questions about this medicine, talk to your doctor, pharmacist, or health care provider.  © 2018 [employer] Standard (2014-10-17 14:48:19)  Atorvastatin tablets  What is this medicine?  ATORVASTATIN (a TORE va sta tin) is known as a HMG-CoA reductase inhibitor or 'statin'. It lowers the level of cholesterol and triglycerides in the blood. This drug may also reduce the risk of heart attack, stroke, or other health problems in patients with risk factors for heart disease. Diet and lifestyle changes are often used with this drug.  This medicine may be used for other purposes; ask your health care provider or pharmacist if you have questions.  COMMON BRAND NAME(S): Lipitor  What should I tell my health care provider before I take this medicine?  They need to know if you have any of these conditions:  -frequently drink alcoholic beverages  -history of stroke, TIA  -kidney disease  -liver disease  -muscle aches or weakness  -other medical condition  -an unusual or allergic reaction to atorvastatin, other medicines, foods, dyes, or preservatives  -pregnant or trying to get pregnant  -breast-feeding  How should I use this medicine?  Take this medicine by mouth with a glass of water. Follow the directions on the prescription label. You can take this medicine with or without food. Take your doses at regular intervals. Do not take your medicine more often than directed.  Talk to your pediatrician regarding the use of this medicine in children. While this drug may be prescribed for children as young as 10 years old for selected conditions, precautions do  apply.  Overdosage: If you think you have taken too much of this medicine contact a poison control center or emergency room at once.  NOTE: This medicine is only for you. Do not share this medicine with others.  What if I miss a dose?  If you miss a dose, take it as soon as you can. If it is almost time for your next dose, take only that dose. Do not take double or extra doses.  What may interact with this medicine?  Do not take this medicine with any of the following medications:  -red yeast rice  -telaprevir  -telithromycin  -voriconazole  This medicine may also interact with the following medications:  -alcohol  -antiviral medicines for HIV or AIDS  -boceprevir  -certain antibiotics like clarithromycin, erythromycin, troleandomycin  -certain medicines for cholesterol like fenofibrate or gemfibrozil  -cimetidine  -clarithromycin  -colchicine  -cyclosporine  -digoxin  -female hormones, like estrogens or progestins and birth control pills  -grapefruit juice  -medicines for fungal infections like fluconazole, itraconazole, ketoconazole  -niacin  -rifampin  -spironolactone  This list may not describe all possible interactions. Give your health care provider a list of all the medicines, herbs, non-prescription drugs, or dietary supplements you use. Also tell them if you smoke, drink alcohol, or use illegal drugs. Some items may interact with your medicine.  What should I watch for while using this medicine?  Visit your doctor or health care professional for regular check-ups. You may need regular tests to make sure your liver is working properly.  Tell your doctor or health care professional right away if you get any unexplained muscle pain, tenderness, or weakness, especially if you also have a fever and tiredness. Your doctor or health care professional may tell you to stop taking this medicine if you develop muscle problems. If your muscle problems do not go away after stopping this medicine, contact your health  care professional.  This drug is only part of a total heart-health program. Your doctor or a dietician can suggest a low-cholesterol and low-fat diet to help. Avoid alcohol and smoking, and keep a proper exercise schedule.  Do not use this drug if you are pregnant or breast-feeding. Serious side effects to an unborn child or to an infant are possible. Talk to your doctor or pharmacist for more information.  This medicine may affect blood sugar levels. If you have diabetes, check with your doctor or health care professional before you change your diet or the dose of your diabetic medicine.  If you are going to have surgery tell your health care professional that you are taking this drug.  What side effects may I notice from receiving this medicine?  Side effects that you should report to your doctor or health care professional as soon as possible:  -allergic reactions like skin rash, itching or hives, swelling of the face, lips, or tongue  -dark urine  -fever  -joint pain  -muscle cramps, pain  -redness, blistering, peeling or loosening of the skin, including inside the mouth  -trouble passing urine or change in the amount of urine  -unusually weak or tired  -yellowing of eyes or skin  Side effects that usually do not require medical attention (report to your doctor or health care professional if they continue or are bothersome):  -constipation  -heartburn  -stomach gas, pain, upset  This list may not describe all possible side effects. Call your doctor for medical advice about side effects. You may report side effects to FDA at 9-578-FDA-7030.  Where should I keep my medicine?  Keep out of the reach of children.  Store at room temperature between 20 to 25 degrees C (68 to 77 degrees F). Throw away any unused medicine after the expiration date.  NOTE: This sheet is a summary. It may not cover all possible information. If you have questions about this medicine, talk to your doctor, pharmacist, or health care  provider.  © 2018 [employer]/Gold Standard (2012-11-06 09:18:24)  Carvedilol tablets  What is this medicine?  CARVEDILOL (NINA ve dil ol) is a beta-blocker. Beta-blockers reduce the workload on the heart and help it to beat more regularly. This medicine is used to treat high blood pressure and heart failure.  This medicine may be used for other purposes; ask your health care provider or pharmacist if you have questions.  COMMON BRAND NAME(S): Coreg  What should I tell my health care provider before I take this medicine?  They need to know if you have any of these conditions:  -circulation problems  -diabetes  -history of heart attack or heart disease  -liver disease  -lung or breathing disease, like asthma or emphysema  -pheochromocytoma  -slow or irregular heartbeat  -thyroid disease  -an unusual or allergic reaction to carvedilol, other beta-blockers, medicines, foods, dyes, or preservatives  -pregnant or trying to get pregnant  -breast-feeding  How should I use this medicine?  Take this medicine by mouth with a glass of water. Follow the directions on the prescription label. It is best to take the tablets with food. Take your doses at regular intervals. Do not take your medicine more often than directed. Do not stop taking except on the advice of your doctor or health care professional.  Talk to your pediatrician regarding the use of this medicine in children. Special care may be needed.  Overdosage: If you think you have taken too much of this medicine contact a poison control center or emergency room at once.  NOTE: This medicine is only for you. Do not share this medicine with others.  What if I miss a dose?  If you miss a dose, take it as soon as you can. If it is almost time for your next dose, take only that dose. Do not take double or extra doses.  What may interact with this medicine?  This medicine may interact with the following medications:  -certain medicines for blood pressure, heart disease, irregular  heart beat  -certain medicines for depression, like fluoxetine or paroxetine  -certain medicines for diabetes, like glipizide or glyburide  -cimetidine  -clonidine  -cyclosporine  -digoxin  -MAOIs like Carbex, Eldepryl, Marplan, Nardil, and Parnate  -reserpine  -rifampin  This list may not describe all possible interactions. Give your health care provider a list of all the medicines, herbs, non-prescription drugs, or dietary supplements you use. Also tell them if you smoke, drink alcohol, or use illegal drugs. Some items may interact with your medicine.  What should I watch for while using this medicine?  Check your heart rate and blood pressure regularly while you are taking this medicine. Ask your doctor or health care professional what your heart rate and blood pressure should be, and when you should contact him or her. Do not stop taking this medicine suddenly. This could lead to serious heart-related effects.  Contact your doctor or health care professional if you have difficulty breathing while taking this drug.  Check your weight daily. Ask your doctor or health care professional when you should notify him/her of any weight gain.  You may get drowsy or dizzy. Do not drive, use machinery, or do anything that requires mental alertness until you know how this medicine affects you. To reduce the risk of dizzy or fainting spells, do not sit or stand up quickly. Alcohol can make you more drowsy, and increase flushing and rapid heartbeats. Avoid alcoholic drinks.  If you have diabetes, check your blood sugar as directed. Tell your doctor if you have changes in your blood sugar while you are taking this medicine.  If you are going to have surgery, tell your doctor or health care professional that you are taking this medicine.  What side effects may I notice from receiving this medicine?  Side effects that you should report to your doctor or health care professional as soon as possible:  -allergic reactions like skin  rash, itching or hives, swelling of the face, lips, or tongue  -breathing problems  -dark urine  -irregular heartbeat  -swollen legs or ankles  -vomiting  -yellowing of the eyes or skin  Side effects that usually do not require medical attention (report to your doctor or health care professional if they continue or are bothersome):  -change in sex drive or performance  -diarrhea  -dry eyes (especially if wearing contact lenses)  -dry, itching skin  -headache  -nausea  -unusually tired  This list may not describe all possible side effects. Call your doctor for medical advice about side effects. You may report side effects to FDA at 6-153-PNP-3660.  Where should I keep my medicine?  Keep out of the reach of children.  Store at room temperature below 30 degrees C (86 degrees F). Protect from moisture. Keep container tightly closed. Throw away any unused medicine after the expiration date.  NOTE: This sheet is a summary. It may not cover all possible information. If you have questions about this medicine, talk to your doctor, pharmacist, or health care provider.  © 2018 [employer] Standard (2014-08-24 14:12:02)

## 2018-03-05 NOTE — PROGRESS NOTES
Chief Complaint   Patient presents with   • Follow-up     cardiac management, labs per PCP. Reports does not remember thing's like used to. Patient brought medication's with visit.    • Shortness of Breath     all the time. Reports has had left heart cath per Dr. Philip in October 2017.    • Fatigue     reports feeling weak all the time.       Subjective       Mandeep William is a 77 y.o. male  with a history of pulmonary fibrosis, hypertension, and Marsh's esophagus. He was referred in November 2016 for increasing shortness of breath and cough. Cardiac stress showed anterior infarct with christa-infarct ischemia, he was started on imdur and cath advised. Cath showed 80-90% occlusion of the circumflex and 2 stents were placed.  The LAD was found to be chronically occluded but no intervention was able to be done as it could not be reached, medical intervention was advised.   At his last office visit he was referred to Dr. Philip for possible heart cath to open up LAD. Dr. Philip ordered cardiac MR which showed most likely nonviable myocardium in the LAD territory. LV ejection fraction was noted as 61%. It was decided to not proceed with cath.   Today he comes to the office for a follow up visit. He continues to report fatigue and shortness of breath, same as before. No new or worsening symptoms noted.     HPI: as above     Cardiac History:    Past Surgical History:   Procedure Laterality Date   • CARDIAC CATHETERIZATION  12/19/2016    100% LAD. 80% OM2   • CARDIAC CATHETERIZATION  12/21/2016    Dr. Pollard- 3.0x18 & 3.0x9 Resolute stents in LCX. Unable to cross  of LAD   • CARDIOVASCULAR STRESS TEST  12/01/2016    L. Myoview- Anterior Infarct with Periinfarct Ischemia.   • CARDIOVASCULAR STRESS TEST  09/26/2017    L. Myoview- Anterior Infarct with PeriInfarct ischemia.. EF > 65%   • COLONOSCOPY     • ECHO - CONVERTED  12/01/2016    EF 60%   • OTHER SURGICAL HISTORY  11/15/2017    cardiac MR- EF 61%, nonviable LAD  territory       Current Outpatient Prescriptions   Medication Sig Dispense Refill   • albuterol (PROVENTIL) (2.5 MG/3ML) 0.083% nebulizer solution Take 2.5 mg by nebulization Every 4 (Four) Hours As Needed for wheezing.     • aspirin 81 MG EC tablet Take 81 mg by mouth Daily.     • atorvastatin (LIPITOR) 10 MG tablet TAKE 1 TABLET EVERY DAY 90 tablet 1   • carvedilol (COREG) 6.25 MG tablet Take 1 tablet by mouth 2 (Two) Times a Day With Meals. 180 tablet 2   • clopidogrel (PLAVIX) 75 MG tablet Take 1 tablet by mouth Daily. 90 tablet 3   • isosorbide mononitrate (IMDUR) 30 MG 24 hr tablet Take 1 tablet by mouth Daily. 90 tablet 2   • losartan-hydrochlorothiazide (HYZAAR) 100-25 MG per tablet Take 1 tablet by mouth Daily.     • omeprazole (priLOSEC) 20 MG capsule Take 20 mg by mouth Daily.     • tamsulosin (FLOMAX) 0.4 MG capsule 24 hr capsule Take 1 capsule by mouth Every Night.       No current facility-administered medications for this visit.        Review of patient's allergies indicates no known allergies.    Past Medical History:   Diagnosis Date   • Marsh's esophagus    • Basal cell carcinoma of skin     surgical removal   • Diaphragmatic hernia    • Diverticulitis    • GERD (gastroesophageal reflux disease)    • Hypertension    • Kidney stones    • Prostatitis    • Pulmonary fibrosis        Social History     Social History   • Marital status:      Spouse name: N/A   • Number of children: N/A   • Years of education: N/A     Occupational History   • Not on file.     Social History Main Topics   • Smoking status: Never Smoker   • Smokeless tobacco: Never Used   • Alcohol use No   • Drug use: No   • Sexual activity: Defer     Other Topics Concern   • Not on file     Social History Narrative       Family History   Problem Relation Age of Onset   • Stroke Mother        Review of Systems   Constitution: Positive for malaise/fatigue. Negative for decreased appetite and diaphoresis.   HENT: Negative for  "congestion and nosebleeds.    Eyes: Negative for blurred vision and visual disturbance.   Cardiovascular: Positive for dyspnea on exertion and leg swelling (mild in feet). Negative for chest pain and palpitations.   Respiratory: Positive for shortness of breath. Negative for cough and sputum production.    Endocrine: Negative for polydipsia, polyphagia and polyuria.   Hematologic/Lymphatic: Negative for bleeding problem. Bruises/bleeds easily.   Skin: Negative for color change and itching.   Musculoskeletal: Negative for muscle cramps and myalgias.   Gastrointestinal: Negative for abdominal pain, change in bowel habit, melena and nausea.   Genitourinary: Negative for dysuria and hematuria.   Neurological: Negative for dizziness and headaches.   Psychiatric/Behavioral: Positive for memory loss. The patient is not nervous/anxious.       Diabetes- No  Thyroid-normal    Objective     /80 (BP Location: Left arm)  Pulse 76  Ht 172.7 cm (68\")  Wt 98.9 kg (218 lb)  BMI 33.15 kg/m2    Physical Exam   Constitutional: He is oriented to person, place, and time. He appears well-nourished.   HENT:   Head: Normocephalic.   Eyes: Conjunctivae are normal. Pupils are equal, round, and reactive to light.   Neck: Normal range of motion. Neck supple. No JVD present. Carotid bruit is not present.   Cardiovascular: Normal rate, regular rhythm, S1 normal and S2 normal.    No murmur heard.  Pulmonary/Chest: Breath sounds normal. He has no wheezes. He has no rales.   Abdominal: Soft. Bowel sounds are normal. He exhibits no distension. There is no tenderness.   Musculoskeletal: Normal range of motion. He exhibits no edema.   Neurological: He is alert and oriented to person, place, and time.   Skin: Skin is warm and dry. No rash noted. No pallor.   Psychiatric: He has a normal mood and affect. His behavior is normal.   Vitals reviewed.     Procedures: none today        Assessment/Plan      Mandeep was seen today for follow-up, shortness " of breath and fatigue.    Diagnoses and all orders for this visit:    Coronary artery disease involving native coronary artery of native heart with other form of angina pectoris    Essential hypertension    Pure hypercholesterolemia    Marsh's esophagus with dysplasia    Medication management        He follows with you for management of labs and reports recently drawn with plan to see you next week for review. Mr. William is on Prilosec for management of Barretts esophagus, but given he also needs Plavix recommend considering changing to Dexilant which would have less interaction or changing to Zantac.   His blood pressure, heart rate and rhythm are normal. Advised to continue Coreg, Imdur and Hyzaar. No refills needed. He reported some mild swelling of feet. He agrees to monitor and call if not improved and will consider changing Hyzaar. Low salt diet also advised.   No further cardiac testing warranted at this time.            Electronically signed by BRAULIO Carver,  March 5, 2018 11:21 AM

## 2018-04-20 ENCOUNTER — TELEPHONE (OUTPATIENT)
Dept: CARDIOLOGY | Facility: CLINIC | Age: 78
End: 2018-04-20

## 2018-04-20 NOTE — TELEPHONE ENCOUNTER
Received fax from Dr. Shashank Clark for cardiac clearance for patient to have a cysto right RPT URS laser lithotripsy with stent. Patient is on Plavix and aspirin but does not need to hold.  According to our records, patient's last stent was on 12/21/16.      Fax [phone]  Phone [phone]

## 2018-06-04 RX ORDER — CLOPIDOGREL BISULFATE 75 MG/1
TABLET ORAL
Qty: 90 TABLET | Refills: 1 | Status: SHIPPED | OUTPATIENT
Start: 2018-06-04 | End: 2018-09-06 | Stop reason: SDUPTHER

## 2018-08-15 RX ORDER — ISOSORBIDE MONONITRATE 30 MG/1
TABLET, EXTENDED RELEASE ORAL
Qty: 90 TABLET | Refills: 0 | Status: SHIPPED | OUTPATIENT
Start: 2018-08-15 | End: 2018-09-06 | Stop reason: SDUPTHER

## 2018-08-15 RX ORDER — CARVEDILOL 6.25 MG/1
TABLET ORAL
Qty: 180 TABLET | Refills: 0 | Status: SHIPPED | OUTPATIENT
Start: 2018-08-15 | End: 2018-09-06 | Stop reason: SDUPTHER

## 2018-08-15 RX ORDER — ATORVASTATIN CALCIUM 10 MG/1
TABLET, FILM COATED ORAL
Qty: 90 TABLET | Refills: 1 | Status: SHIPPED | OUTPATIENT
Start: 2018-08-15 | End: 2018-09-06 | Stop reason: SDUPTHER

## 2018-09-06 ENCOUNTER — OFFICE VISIT (OUTPATIENT)
Dept: CARDIOLOGY | Facility: CLINIC | Age: 78
End: 2018-09-06

## 2018-09-06 VITALS
HEIGHT: 68 IN | BODY MASS INDEX: 32.43 KG/M2 | DIASTOLIC BLOOD PRESSURE: 60 MMHG | SYSTOLIC BLOOD PRESSURE: 102 MMHG | WEIGHT: 214 LBS | HEART RATE: 68 BPM

## 2018-09-06 DIAGNOSIS — E78.00 PURE HYPERCHOLESTEROLEMIA: ICD-10-CM

## 2018-09-06 DIAGNOSIS — I25.119 CORONARY ARTERY DISEASE INVOLVING NATIVE CORONARY ARTERY OF NATIVE HEART WITH ANGINA PECTORIS (HCC): ICD-10-CM

## 2018-09-06 DIAGNOSIS — I10 ESSENTIAL HYPERTENSION: ICD-10-CM

## 2018-09-06 DIAGNOSIS — I25.118 CORONARY ARTERY DISEASE INVOLVING NATIVE CORONARY ARTERY OF NATIVE HEART WITH OTHER FORM OF ANGINA PECTORIS (HCC): Primary | ICD-10-CM

## 2018-09-06 PROCEDURE — 99214 OFFICE O/P EST MOD 30 MIN: CPT | Performed by: NURSE PRACTITIONER

## 2018-09-06 RX ORDER — FINASTERIDE 5 MG/1
5 TABLET, FILM COATED ORAL DAILY
COMMUNITY
End: 2021-10-14

## 2018-09-06 RX ORDER — DEXLANSOPRAZOLE 60 MG/1
60 CAPSULE, DELAYED RELEASE ORAL DAILY
Qty: 90 CAPSULE | Refills: 3 | Status: SHIPPED | OUTPATIENT
Start: 2018-09-06 | End: 2018-10-06

## 2018-09-06 RX ORDER — ATORVASTATIN CALCIUM 10 MG/1
10 TABLET, FILM COATED ORAL DAILY
Qty: 90 TABLET | Refills: 3 | Status: SHIPPED | OUTPATIENT
Start: 2018-09-06 | End: 2019-08-19 | Stop reason: SDUPTHER

## 2018-09-06 RX ORDER — ISOSORBIDE MONONITRATE 30 MG/1
30 TABLET, EXTENDED RELEASE ORAL DAILY
Qty: 90 TABLET | Refills: 3 | Status: SHIPPED | OUTPATIENT
Start: 2018-09-06 | End: 2019-03-07 | Stop reason: SDUPTHER

## 2018-09-06 RX ORDER — LOSARTAN POTASSIUM AND HYDROCHLOROTHIAZIDE 25; 100 MG/1; MG/1
1 TABLET ORAL DAILY
Qty: 90 TABLET | Refills: 3 | Status: SHIPPED | OUTPATIENT
Start: 2018-09-06 | End: 2019-05-09 | Stop reason: ALTCHOICE

## 2018-09-06 RX ORDER — CARVEDILOL 6.25 MG/1
6.25 TABLET ORAL 2 TIMES DAILY WITH MEALS
Qty: 180 TABLET | Refills: 3 | Status: SHIPPED | OUTPATIENT
Start: 2018-09-06 | End: 2019-08-19 | Stop reason: SDUPTHER

## 2018-09-06 RX ORDER — CLOPIDOGREL BISULFATE 75 MG/1
75 TABLET ORAL DAILY
Qty: 90 TABLET | Refills: 3 | Status: SHIPPED | OUTPATIENT
Start: 2018-09-06 | End: 2019-03-07 | Stop reason: SDUPTHER

## 2018-09-06 NOTE — PROGRESS NOTES
"Chief Complaint   Patient presents with   • Follow-up     Cardiac management. He states \"I feel weak most of the time\", relates to age.   • Chest Pain     He states \"I have a little tightness to left chest\".    • Shortness of Breath     Having with exertion. He reports follows with Dr Powers, has appointment soon.   • Med Refill     Needs refills on cardiac medication-90 day.       Subjective       Mandeep William is a 77 y.o. male with a history of pulmonary fibrosis, hypertension, and Marsh's esophagus. He was referred in November 2016 for increasing shortness of breath and cough. Cardiac stress showed anterior infarct with christa-infarct ischemia, he was started on Imdur and cath advised. Cath showed 80-90% occlusion of the circumflex and 2 stents were placed.  The LAD was found to be chronically occluded but no intervention was able to be done as it could not be reached, medical intervention was advised. He was referred to Dr. Philip for possible heart cath to open up LAD. Dr. Philip ordered cardiac MR on 11/25/17 which showed most likely nonviable myocardium in the LAD territory. LV ejection fraction was noted as 61%. It was decided to not proceed with cath. He came in today for follow up. He denies any significant chest pain. Does have some mild shortness of breath and fatigue which is unchanged. He has been a little more active this summer, mows his yard and performs ADLs independently and without symptoms. He follows with Dr. Powers. Does not use O2 or CPAP. Refills requested. Labs are followed with Dr. Kimball. LDL 45 in March 18. Thank you for sending. He is noted to be on omeprazole and Plavix.      HPI         Cardiac History:    Past Surgical History:   Procedure Laterality Date   • CARDIAC CATHETERIZATION  12/19/2016    100% LAD. 80% OM2   • CARDIAC CATHETERIZATION  12/21/2016    Dr. Pollard- 3.0x18 & 3.0x9 Resolute stents in LCX. Unable to cross  of LAD   • CARDIOVASCULAR STRESS TEST  12/01/2016    [facility]" Myoview- Anterior Infarct with Periinfarct Ischemia.   • CARDIOVASCULAR STRESS TEST  09/26/2017    L. Myoview- Anterior Infarct with PeriInfarct ischemia.. EF > 65%   • COLONOSCOPY     • ECHO - CONVERTED  12/01/2016    EF 60%   • OTHER SURGICAL HISTORY  11/15/2017    cardiac MR- EF 61%, nonviable LAD territory       Current Outpatient Prescriptions   Medication Sig Dispense Refill   • albuterol (PROVENTIL) (2.5 MG/3ML) 0.083% nebulizer solution Take 2.5 mg by nebulization Every 4 (Four) Hours As Needed for wheezing.     • aspirin 81 MG EC tablet Take 81 mg by mouth Daily.     • atorvastatin (LIPITOR) 10 MG tablet Take 1 tablet by mouth Daily. 90 tablet 3   • carvedilol (COREG) 6.25 MG tablet Take 1 tablet by mouth 2 (Two) Times a Day With Meals. 180 tablet 3   • clopidogrel (PLAVIX) 75 MG tablet Take 1 tablet by mouth Daily. 90 tablet 3   • finasteride (PROSCAR) 5 MG tablet Take 5 mg by mouth Daily.     • isosorbide mononitrate (IMDUR) 30 MG 24 hr tablet Take 1 tablet by mouth Daily. 90 tablet 3   • losartan-hydrochlorothiazide (HYZAAR) 100-25 MG per tablet Take 1 tablet by mouth Daily. 90 tablet 3   • tamsulosin (FLOMAX) 0.4 MG capsule 24 hr capsule Take 1 capsule by mouth Every Night.     • dexlansoprazole (DEXILANT) 60 MG capsule Take 1 capsule by mouth Daily for 30 days. Stop omeprazole secondary to Plavix therapy   Hx of Marsh's 90 capsule 3     No current facility-administered medications for this visit.        Patient has no known allergies.    Past Medical History:   Diagnosis Date   • Marsh's esophagus    • Basal cell carcinoma of skin     surgical removal   • Diaphragmatic hernia    • Diverticulitis    • GERD (gastroesophageal reflux disease)    • Hypertension    • Kidney stones    • Prostatitis    • Pulmonary fibrosis (CMS/HCC)        Social History     Social History   • Marital status:      Spouse name: N/A   • Number of children: N/A   • Years of education: N/A     Occupational History   •  "Not on file.     Social History Main Topics   • Smoking status: Never Smoker   • Smokeless tobacco: Never Used   • Alcohol use No   • Drug use: No   • Sexual activity: Defer     Other Topics Concern   • Not on file     Social History Narrative   • No narrative on file       Family History   Problem Relation Age of Onset   • Stroke Mother        Review of Systems   Constitution: Positive for malaise/fatigue (fatigues after exertion ) and weight loss (down 4 lb ). Negative for decreased appetite and weakness.   HENT: Negative.    Eyes: Negative for blurred vision.   Cardiovascular: Positive for chest pain (mild tightness occasionally ) and dyspnea on exertion. Negative for claudication, leg swelling, orthopnea, palpitations and syncope.   Respiratory: Positive for shortness of breath (only with moderate exertion ). Negative for cough.    Endocrine: Negative.    Hematologic/Lymphatic: Negative.    Skin: Negative.    Musculoskeletal: Positive for arthritis. Negative for falls and myalgias.   Gastrointestinal: Negative for abdominal pain and melena.   Genitourinary: Negative for dysuria and hematuria.   Neurological: Negative for dizziness.   Psychiatric/Behavioral: Negative for altered mental status and depression.   Allergic/Immunologic: Negative.       Diabetes- No  Thyroid-normal    Objective     /60 (BP Location: Right arm)   Pulse 68   Ht 172.7 cm (67.99\")   Wt 97.1 kg (214 lb)   BMI 32.55 kg/m²     Physical Exam   Constitutional: He is oriented to person, place, and time. He appears well-developed and well-nourished.   HENT:   Head: Normocephalic.   Eyes: Pupils are equal, round, and reactive to light.   Neck: Normal range of motion.   Cardiovascular: Normal rate, regular rhythm and intact distal pulses.    Murmur heard.  Pulmonary/Chest: Effort normal and breath sounds normal. No respiratory distress. He has no wheezes.   Abdominal: Soft. Bowel sounds are normal. He exhibits no distension. "   Musculoskeletal: Normal range of motion. He exhibits no edema.   Neurological: He is alert and oriented to person, place, and time.   Skin: Skin is warm and dry.   Psychiatric: He has a normal mood and affect.   Nursing note and vitals reviewed.         Procedures          Assessment/Plan    Heart rate and blood pressure well controlled. We reviewed cardiac cath and MRI findings. I explained to him Dr. Philip recommendation for medical management secondary to nonviable myocardium in the LAD territory. He did have questions about the stents placed with I answered. Anginal symptoms are well controlled at present. He is aware to contact our office should he develop any new or worsening pain, and Lexiscan will be recommended. Continue Plavix, aspirin, losartan/hctz, isosorbide, carvedilol. LDL very well controlled with low dose atorvastatin. I have asked him to change PPI from omeprazole to Dexilant secondary to Plavix therapy.  If the insurance does not cover, will try alternative PPI. He does have Marsh's, so long-term PPI is recommended. He plans to follow with you for labs. He follows up with Dr. Priya ferrer for pulmonary fibrosis management. BMI remains elevated. Heart healthy diet encouraged. Regular walking as he can tolerate. We will see him back in six months for follow up or sooner if needed.   Mandeep was seen today for follow-up, chest pain, shortness of breath and med refill.    Diagnoses and all orders for this visit:    Coronary artery disease involving native coronary artery of native heart with other form of angina pectoris (CMS/HCC)    Essential hypertension    Pure hypercholesterolemia    Coronary artery disease involving native coronary artery of native heart with angina pectoris (CMS/HCC)    Other orders  -     dexlansoprazole (DEXILANT) 60 MG capsule; Take 1 capsule by mouth Daily for 30 days. Stop omeprazole secondary to Plavix therapy   Hx of Marsh's  -     carvedilol (COREG) 6.25 MG tablet;  Take 1 tablet by mouth 2 (Two) Times a Day With Meals.  -     losartan-hydrochlorothiazide (HYZAAR) 100-25 MG per tablet; Take 1 tablet by mouth Daily.  -     atorvastatin (LIPITOR) 10 MG tablet; Take 1 tablet by mouth Daily.  -     isosorbide mononitrate (IMDUR) 30 MG 24 hr tablet; Take 1 tablet by mouth Daily.  -     clopidogrel (PLAVIX) 75 MG tablet; Take 1 tablet by mouth Daily.        Patient's Body mass index is 32.55 kg/m². BMI is above normal parameters. Recommendations include: nutrition counseling.                 Electronically signed by BRAULIO Henderson,  September 6, 2018 11:12 AM

## 2019-03-07 ENCOUNTER — OFFICE VISIT (OUTPATIENT)
Dept: CARDIOLOGY | Facility: CLINIC | Age: 79
End: 2019-03-07

## 2019-03-07 VITALS
BODY MASS INDEX: 32.43 KG/M2 | HEART RATE: 68 BPM | WEIGHT: 214 LBS | DIASTOLIC BLOOD PRESSURE: 70 MMHG | HEIGHT: 68 IN | SYSTOLIC BLOOD PRESSURE: 112 MMHG

## 2019-03-07 DIAGNOSIS — R06.02 SHORTNESS OF BREATH: ICD-10-CM

## 2019-03-07 DIAGNOSIS — I10 ESSENTIAL HYPERTENSION: ICD-10-CM

## 2019-03-07 DIAGNOSIS — E66.9 OBESITY (BMI 30.0-34.9): ICD-10-CM

## 2019-03-07 DIAGNOSIS — J84.10 PULMONARY FIBROSIS (HCC): ICD-10-CM

## 2019-03-07 DIAGNOSIS — I25.118 CORONARY ARTERY DISEASE INVOLVING NATIVE CORONARY ARTERY OF NATIVE HEART WITH OTHER FORM OF ANGINA PECTORIS (HCC): Primary | ICD-10-CM

## 2019-03-07 DIAGNOSIS — E78.00 PURE HYPERCHOLESTEROLEMIA: ICD-10-CM

## 2019-03-07 PROCEDURE — 99213 OFFICE O/P EST LOW 20 MIN: CPT | Performed by: NURSE PRACTITIONER

## 2019-03-07 RX ORDER — CLOPIDOGREL BISULFATE 75 MG/1
75 TABLET ORAL DAILY
Qty: 90 TABLET | Refills: 3 | Status: SHIPPED | OUTPATIENT
Start: 2019-03-07 | End: 2019-09-17 | Stop reason: SDUPTHER

## 2019-03-07 RX ORDER — OMEPRAZOLE 20 MG/1
20 CAPSULE, DELAYED RELEASE ORAL DAILY
COMMUNITY
End: 2019-09-17 | Stop reason: ALTCHOICE

## 2019-03-07 RX ORDER — ISOSORBIDE MONONITRATE 30 MG/1
30 TABLET, EXTENDED RELEASE ORAL DAILY
Qty: 90 TABLET | Refills: 3 | Status: SHIPPED | OUTPATIENT
Start: 2019-03-07 | End: 2019-05-09 | Stop reason: SDUPTHER

## 2019-03-07 NOTE — PROGRESS NOTES
Chief Complaint   Patient presents with   • Follow-up     For cardiac management. Patient on aspirin. Reports that he has had shortness of breath, but reports that it is about the same as before. Last lab work was done a few months ago per PCP, not in chart. Will have done next time he sees PCP.    • Med Refill     Needs refills on cardiac medications. 90 day supplies to [facility].        Cardiac Complaints  dyspnea      Subjective   Mandeep William is a 78 y.o. male with pulmonary fibrosis, hypertension, and Marsh's esophagus. He was referred in November 2016 for increasing shortness of breath and cough. Cardiac stress showed anterior infarct with christa-infarct ischemia, he was started on Imdur and cath advised. Cath showed 80-90% occlusion of the circumflex and 2 stents were placed.  The LAD was found to be chronically occluded but no intervention was able to be done as it could not be reached, medical intervention was advised. He was referred to Dr. Philip for possible heart cath to open up LAD. Dr. Philip ordered cardiac MR on 11/25/17 which showed most likely nonviable myocardium in the LAD territory. LV ejection fraction was noted as 61%. It was decided to not proceed with cath. He returns today for follow up and new or worsening cardiac concerns are denied.  Patient reports he has not experienced any chest pain, palpitations, or dizziness.  Patient does admit to some shortness of breath but when questioned, states it is the same as it has been for sometime.  He continues to follow with Dr. Powers for pulmonary management.   He does continue to walk on his treadmill about everyday and does so without concerns. Patient does report also following closely with GI for Marsh's esophagus and states he has had frequent colonoscopies due to polyps but has done well. He reports labs are followed by PCP and were most recently done a few months back.  No current are available but he does report they are to be done again  soon.  Refills of cardiac meds requested for 90 day supply.        Cardiac History  Past Surgical History:   Procedure Laterality Date   • CARDIAC CATHETERIZATION  12/19/2016    100% LAD. 80% OM2   • CARDIAC CATHETERIZATION  12/21/2016    Dr. Pollard- 3.0x18 & 3.0x9 Resolute stents in LCX. Unable to cross  of LAD   • CARDIOVASCULAR STRESS TEST  12/01/2016    L. Myoview- Anterior Infarct with Periinfarct Ischemia.   • CARDIOVASCULAR STRESS TEST  09/26/2017    L. Myoview- Anterior Infarct with PeriInfarct ischemia.. EF > 65%   • COLONOSCOPY     • ECHO - CONVERTED  12/01/2016    EF 60%   • OTHER SURGICAL HISTORY  11/15/2017    cardiac MR- EF 61%, nonviable LAD territory       Current Outpatient Medications   Medication Sig Dispense Refill   • albuterol (PROVENTIL) (2.5 MG/3ML) 0.083% nebulizer solution Take 2.5 mg by nebulization Every 4 (Four) Hours As Needed for wheezing.     • aspirin 81 MG EC tablet Take 81 mg by mouth Daily.     • atorvastatin (LIPITOR) 10 MG tablet Take 1 tablet by mouth Daily. 90 tablet 3   • carvedilol (COREG) 6.25 MG tablet Take 1 tablet by mouth 2 (Two) Times a Day With Meals. 180 tablet 3   • clopidogrel (PLAVIX) 75 MG tablet Take 1 tablet by mouth Daily. 90 tablet 3   • finasteride (PROSCAR) 5 MG tablet Take 5 mg by mouth Daily.     • isosorbide mononitrate (IMDUR) 30 MG 24 hr tablet Take 1 tablet by mouth Daily. 90 tablet 3   • losartan-hydrochlorothiazide (HYZAAR) 100-25 MG per tablet Take 1 tablet by mouth Daily. 90 tablet 3   • omeprazole (priLOSEC) 20 MG capsule Take 20 mg by mouth Daily.     • tamsulosin (FLOMAX) 0.4 MG capsule 24 hr capsule Take 1 capsule by mouth Every Night.       No current facility-administered medications for this visit.        Patient has no known allergies.    Past Medical History:   Diagnosis Date   • Marsh's esophagus    • Basal cell carcinoma of skin     surgical removal   • Diaphragmatic hernia    • Diverticulitis    • GERD (gastroesophageal reflux  "disease)    • Hypertension    • Kidney stones    • Prostatitis    • Pulmonary fibrosis (CMS/HCC)        Social History     Socioeconomic History   • Marital status:      Spouse name: Not on file   • Number of children: Not on file   • Years of education: Not on file   • Highest education level: Not on file   Social Needs   • Financial resource strain: Not on file   • Food insecurity - worry: Not on file   • Food insecurity - inability: Not on file   • Transportation needs - medical: Not on file   • Transportation needs - non-medical: Not on file   Occupational History   • Not on file   Tobacco Use   • Smoking status: Never Smoker   • Smokeless tobacco: Never Used   Substance and Sexual Activity   • Alcohol use: No   • Drug use: No   • Sexual activity: Defer   Other Topics Concern   • Not on file   Social History Narrative   • Not on file       Family History   Problem Relation Age of Onset   • Stroke Mother        Review of Systems   Constitution: Negative for weakness and malaise/fatigue.   Cardiovascular: Negative for chest pain, claudication, dyspnea on exertion, irregular heartbeat, near-syncope, orthopnea, palpitations and syncope.   Respiratory: Negative for cough, shortness of breath and wheezing.    Musculoskeletal: Negative for back pain, joint swelling and muscle cramps.   Gastrointestinal: Negative for anorexia, heartburn, nausea and vomiting.   Genitourinary: Negative for dysuria, hematuria, hesitancy and nocturia.   Neurological: Negative for dizziness, light-headedness and loss of balance.   Psychiatric/Behavioral: Negative for depression and memory loss. The patient is not nervous/anxious.            Objective     /70 (BP Location: Left arm)   Pulse 68   Ht 172.7 cm (67.99\")   Wt 97.1 kg (214 lb)   BMI 32.55 kg/m²     Physical Exam   Constitutional: He is oriented to person, place, and time. He appears well-developed and well-nourished.   HENT:   Head: Normocephalic and atraumatic. "   Eyes: EOM are normal. Pupils are equal, round, and reactive to light.   Neck: Normal range of motion. Neck supple.   Cardiovascular: Normal rate and regular rhythm.   Murmur heard.  Pulmonary/Chest: Effort normal and breath sounds normal.   Abdominal: Soft.   Musculoskeletal: Normal range of motion.   Neurological: He is alert and oriented to person, place, and time.   Skin: Skin is warm and dry.   Psychiatric: He has a normal mood and affect. His behavior is normal.       Procedures    Assessment/Plan     HR and BP are stable.  HTN is well managed on current, same advised. For CAD, he has continued on DAPT therapy as area of LAD non-viable.  He denies any new concerns and states he has not had any chest pain, current imdur therapy will be continued. He does admit to shortness of breath but denies any worse than before.  Patient continues to follow with Dr. Powers in regards to pulmonary fibrosis management.  At next visit, repeat echo will be considered to assess LV function as well as RSVP secondary to non-viable portion of LAD and underlying pulmonary fibrosis.  For Marsh's esophagus, he has continued on PPI followed by GI.  Labs including FLP monitored by PCP but patient reports continued use of statin therapy (lipitor) without concerns.  He states labs will be done again soon.  Could we have a copy for review?  Refills of plavix and imdur sent per request.  BMI elevated at 32.55 but similar to prior.  Continued cardiac diet with limited fat, caloric intake, and carbs recommended as well as continued walking on his treadmill.  6 month follow up scheduled or sooner if needed. Patient aware to call with concerns.          Problems Addressed this Visit        Cardiovascular and Mediastinum    CAD (coronary artery disease) - Primary    Relevant Medications    clopidogrel (PLAVIX) 75 MG tablet    isosorbide mononitrate (IMDUR) 30 MG 24 hr tablet    HTN (hypertension)    Hyperlipemia       Respiratory    Pulmonary  fibrosis (CMS/HCC)      Other Visit Diagnoses     Shortness of breath        Obesity (BMI 30.0-34.9)              Patient's Body mass index is 32.55 kg/m². BMI is above normal parameters. Recommendations include: nutrition counseling.          Electronically signed by BRAULIO Kate March 8, 2019 12:55 PM

## 2019-04-30 ENCOUNTER — TELEPHONE (OUTPATIENT)
Dept: CARDIOLOGY | Facility: CLINIC | Age: 79
End: 2019-04-30

## 2019-04-30 DIAGNOSIS — R06.02 SHORTNESS OF BREATH: ICD-10-CM

## 2019-04-30 DIAGNOSIS — R07.89 OTHER CHEST PAIN: Primary | ICD-10-CM

## 2019-04-30 NOTE — TELEPHONE ENCOUNTER
PATIENT CAME BY AND IS C/O CHEST PRESSURE WHEN COUGHS AND HE THINKS IT IS DUE TO STINTS.  PLEASE CALL [phone]

## 2019-04-30 NOTE — TELEPHONE ENCOUNTER
Spoke with patient and patient states that he only has the chest pressure when he coughs. He states that he is concerned about stents that he had put in on 12/21/16. He was made aware that if chest pain worsens or happens without cough to go to the ER.     I made him aware that I would let you know to find out if you have any recommendations.

## 2019-05-06 ENCOUNTER — HOSPITAL ENCOUNTER (OUTPATIENT)
Dept: CARDIOLOGY | Facility: HOSPITAL | Age: 79
Discharge: HOME | End: 2019-05-06
Admitting: NURSE PRACTITIONER

## 2019-05-06 DIAGNOSIS — R07.89 OTHER CHEST PAIN: ICD-10-CM

## 2019-05-06 DIAGNOSIS — R06.02 SHORTNESS OF BREATH: ICD-10-CM

## 2019-05-06 LAB
BH CV ECHO MEAS - ACS: 2.2 CM
BH CV ECHO MEAS - AO MEAN PG: 3.2 MMHG
BH CV ECHO MEAS - AO ROOT AREA (BSA CORRECTED): 1.7
BH CV ECHO MEAS - AO ROOT AREA: 9.5 CM^2
BH CV ECHO MEAS - AO ROOT DIAM: 3.5 CM
BH CV ECHO MEAS - AO V2 MEAN: 84.9 CM/SEC
BH CV ECHO MEAS - AO V2 VTI: 24 CM
BH CV ECHO MEAS - BSA(HAYCOCK): 2.2 M^2
BH CV ECHO MEAS - BSA: 2.1 M^2
BH CV ECHO MEAS - BZI_BMI: 33.5 KILOGRAMS/M^2
BH CV ECHO MEAS - BZI_METRIC_HEIGHT: 170.2 CM
BH CV ECHO MEAS - BZI_METRIC_WEIGHT: 97.1 KG
BH CV ECHO MEAS - EDV(CUBED): 86.8 ML
BH CV ECHO MEAS - EDV(MOD-SP4): 92 ML
BH CV ECHO MEAS - EDV(TEICH): 89 ML
BH CV ECHO MEAS - EF(CUBED): 72.4 %
BH CV ECHO MEAS - EF(TEICH): 64.4 %
BH CV ECHO MEAS - ESV(CUBED): 23.9 ML
BH CV ECHO MEAS - ESV(TEICH): 31.7 ML
BH CV ECHO MEAS - FS: 34.9 %
BH CV ECHO MEAS - IVS/LVPW: 0.9
BH CV ECHO MEAS - IVSD: 1.2 CM
BH CV ECHO MEAS - LA DIMENSION: 3.5 CM
BH CV ECHO MEAS - LA/AO: 1
BH CV ECHO MEAS - LV DIASTOLIC VOL/BSA (35-75): 44.2 ML/M^2
BH CV ECHO MEAS - LV IVRT: 0.17 SEC
BH CV ECHO MEAS - LV MASS(C)D: 196.1 GRAMS
BH CV ECHO MEAS - LV MASS(C)DI: 94.2 GRAMS/M^2
BH CV ECHO MEAS - LVIDD: 4.4 CM
BH CV ECHO MEAS - LVIDS: 2.9 CM
BH CV ECHO MEAS - LVLD AP4: 7.5 CM
BH CV ECHO MEAS - LVOT AREA (M): 4.9 CM^2
BH CV ECHO MEAS - LVOT AREA: 4.7 CM^2
BH CV ECHO MEAS - LVOT DIAM: 2.5 CM
BH CV ECHO MEAS - LVPWD: 1.3 CM
BH CV ECHO MEAS - MV A MAX VEL: 88.4 CM/SEC
BH CV ECHO MEAS - MV DEC SLOPE: 215.9 CM/SEC^2
BH CV ECHO MEAS - MV E MAX VEL: 66.1 CM/SEC
BH CV ECHO MEAS - MV E/A: 0.75
BH CV ECHO MEAS - RAP SYSTOLE: 10 MMHG
BH CV ECHO MEAS - RVDD: 2.5 CM
BH CV ECHO MEAS - RVSP: 41 MMHG
BH CV ECHO MEAS - SI(AO): 109.3 ML/M^2
BH CV ECHO MEAS - SI(CUBED): 30.2 ML/M^2
BH CV ECHO MEAS - SI(TEICH): 27.5 ML/M^2
BH CV ECHO MEAS - SV(AO): 227.4 ML
BH CV ECHO MEAS - SV(CUBED): 62.9 ML
BH CV ECHO MEAS - SV(TEICH): 57.3 ML
BH CV ECHO MEAS - TR MAX VEL: 278.3 CM/SEC
MAXIMAL PREDICTED HEART RATE: 143 BPM
STRESS TARGET HR: 122 BPM

## 2019-05-06 PROCEDURE — 93306 TTE W/DOPPLER COMPLETE: CPT

## 2019-05-06 PROCEDURE — 93306 TTE W/DOPPLER COMPLETE: CPT | Performed by: INTERNAL MEDICINE

## 2019-05-09 ENCOUNTER — TELEPHONE (OUTPATIENT)
Dept: CARDIOLOGY | Facility: CLINIC | Age: 79
End: 2019-05-09

## 2019-05-09 RX ORDER — POTASSIUM CHLORIDE 750 MG/1
10 TABLET, FILM COATED, EXTENDED RELEASE ORAL DAILY
Qty: 90 TABLET | Refills: 0 | Status: SHIPPED | OUTPATIENT
Start: 2019-05-09 | End: 2019-09-17 | Stop reason: SDUPTHER

## 2019-05-09 RX ORDER — LOSARTAN POTASSIUM 25 MG/1
25 TABLET ORAL DAILY
Qty: 90 TABLET | Refills: 0 | Status: SHIPPED | OUTPATIENT
Start: 2019-05-09 | End: 2019-09-17 | Stop reason: SDUPTHER

## 2019-05-09 RX ORDER — FUROSEMIDE 20 MG/1
20 TABLET ORAL 2 TIMES DAILY
Qty: 60 TABLET | Refills: 11 | Status: SHIPPED | OUTPATIENT
Start: 2019-05-09 | End: 2019-09-17 | Stop reason: SDUPTHER

## 2019-05-09 RX ORDER — ISOSORBIDE MONONITRATE 30 MG/1
30 TABLET, EXTENDED RELEASE ORAL 2 TIMES DAILY
Qty: 180 TABLET | Refills: 0
Start: 2019-05-09 | End: 2019-09-17 | Stop reason: DRUGHIGH

## 2019-05-09 NOTE — PROGRESS NOTES
Please let Mr. William know that the LAD portion of the heart is felt to be non-viable, meaning it can not be repaired.  His LV function remains normal.  Many of his issues seem to be related to his pulmonary fibrosis and some may be from chronically occluded LAD. Stop hyzaar.  Change to losartan 25mg and lasix 20mg with 10 MEQ of K.  Increase his imdur to 30mg BID.  Please get most recent labs from [facility].

## 2019-05-09 NOTE — TELEPHONE ENCOUNTER
See echo results. Patient aware. Scripts for Lasix 20mg daily, losartan 25mg daily, and Potassium 10meq daily sent to pharmacy.

## 2019-08-20 RX ORDER — CARVEDILOL 6.25 MG/1
TABLET ORAL
Qty: 180 TABLET | Refills: 0 | Status: SHIPPED | OUTPATIENT
Start: 2019-08-20 | End: 2019-09-17 | Stop reason: SDUPTHER

## 2019-08-20 RX ORDER — ATORVASTATIN CALCIUM 10 MG/1
TABLET, FILM COATED ORAL
Qty: 90 TABLET | Refills: 0 | Status: SHIPPED | OUTPATIENT
Start: 2019-08-20 | End: 2019-09-17 | Stop reason: SDUPTHER

## 2019-09-17 ENCOUNTER — OFFICE VISIT (OUTPATIENT)
Dept: CARDIOLOGY | Facility: CLINIC | Age: 79
End: 2019-09-17

## 2019-09-17 VITALS
DIASTOLIC BLOOD PRESSURE: 64 MMHG | SYSTOLIC BLOOD PRESSURE: 110 MMHG | WEIGHT: 207.2 LBS | HEIGHT: 68 IN | HEART RATE: 74 BPM | BODY MASS INDEX: 31.4 KG/M2

## 2019-09-17 DIAGNOSIS — I10 ESSENTIAL HYPERTENSION: ICD-10-CM

## 2019-09-17 DIAGNOSIS — J84.10 PULMONARY FIBROSIS (HCC): ICD-10-CM

## 2019-09-17 DIAGNOSIS — I25.9 IHD (ISCHEMIC HEART DISEASE): Primary | ICD-10-CM

## 2019-09-17 DIAGNOSIS — E78.00 PURE HYPERCHOLESTEROLEMIA: ICD-10-CM

## 2019-09-17 PROCEDURE — 99213 OFFICE O/P EST LOW 20 MIN: CPT | Performed by: NURSE PRACTITIONER

## 2019-09-17 RX ORDER — CARVEDILOL 6.25 MG/1
6.25 TABLET ORAL 2 TIMES DAILY WITH MEALS
Qty: 180 TABLET | Refills: 0 | Status: SHIPPED | OUTPATIENT
Start: 2019-09-17 | End: 2020-03-09

## 2019-09-17 RX ORDER — POTASSIUM CHLORIDE 750 MG/1
10 TABLET, FILM COATED, EXTENDED RELEASE ORAL DAILY
Qty: 90 TABLET | Refills: 0 | Status: SHIPPED | OUTPATIENT
Start: 2019-09-17 | End: 2019-12-03 | Stop reason: SDUPTHER

## 2019-09-17 RX ORDER — FUROSEMIDE 20 MG/1
20 TABLET ORAL 2 TIMES DAILY
Qty: 180 TABLET | Refills: 3 | Status: SHIPPED | OUTPATIENT
Start: 2019-09-17 | End: 2020-07-13

## 2019-09-17 RX ORDER — PANTOPRAZOLE SODIUM 40 MG/1
40 TABLET, DELAYED RELEASE ORAL DAILY
COMMUNITY

## 2019-09-17 RX ORDER — CLOPIDOGREL BISULFATE 75 MG/1
75 TABLET ORAL DAILY
Qty: 90 TABLET | Refills: 3 | Status: SHIPPED | OUTPATIENT
Start: 2019-09-17 | End: 2020-09-17 | Stop reason: SDUPTHER

## 2019-09-17 RX ORDER — ISOSORBIDE MONONITRATE 30 MG/1
30 TABLET, EXTENDED RELEASE ORAL DAILY
COMMUNITY
End: 2019-09-17 | Stop reason: SDUPTHER

## 2019-09-17 RX ORDER — ATORVASTATIN CALCIUM 10 MG/1
10 TABLET, FILM COATED ORAL DAILY
Qty: 90 TABLET | Refills: 0 | Status: SHIPPED | OUTPATIENT
Start: 2019-09-17 | End: 2019-12-03 | Stop reason: SDUPTHER

## 2019-09-17 RX ORDER — ISOSORBIDE MONONITRATE 30 MG/1
30 TABLET, EXTENDED RELEASE ORAL DAILY
Qty: 90 TABLET | Refills: 3 | Status: SHIPPED | OUTPATIENT
Start: 2019-09-17 | End: 2020-06-29

## 2019-09-17 RX ORDER — LOSARTAN POTASSIUM 25 MG/1
25 TABLET ORAL DAILY
Qty: 90 TABLET | Refills: 0 | Status: SHIPPED | OUTPATIENT
Start: 2019-09-17 | End: 2019-12-03 | Stop reason: SDUPTHER

## 2019-09-17 NOTE — PROGRESS NOTES
"Chief Complaint   Patient presents with   • Follow-up     Cardiac management. Takes daily Aspirin . Labs per PCP a couple weeks ago.   • Shortness of Breath     Has Pulmonary fibrosis, takes nebulizer treatments daily.   • Med Refill     Needs refills on  Cardiac meds  . Had ran out of K+ and Losartan ( no bottles ) , has not been taking d/t being out of. .Had medication bottles with him today.       Subjective       Mandeep William is a 78 y.o. male with pulmonary fibrosis, hypertension, and Marsh's esophagus. He was referred in November 2016 for increasing shortness of breath and cough. Cardiac stress showed anterior infarct with christa-infarct ischemia, he was started on Imdur and cath advised. Cath showed 80-90% occlusion of the circumflex and 2 stents were placed.  The LAD was found to be chronically occluded but no intervention was able to be done as it could not be reached, medical intervention was advised. He was referred to Dr. Philip for possible heart cath to open up LAD. Dr. Philip ordered cardiac MR on 11/25/17 which showed most likely nonviable myocardium in the LAD territory. LV ejection fraction was noted as 61%. It was decided to not proceed with cath. Repeat echo in May showed LVEF remains normal and PA pressure slightly increased, 41 mmHg. He follows with Dr. Powers for pulmonary management.    Today he comes to the office for a follow-up visit.  He denies chest pain or palpitations.  He does have fatigue but maintains his normal daily activities.  No recent change in cardiac medications noted.  He does report increased dose of \"breathing treatment\".  According to patient his shortness of breath does now seem some better.    HPI     Cardiac History:    Past Surgical History:   Procedure Laterality Date   • CARDIAC CATHETERIZATION  12/19/2016    100% LAD. 80% OM2   • CARDIAC CATHETERIZATION  12/21/2016    Dr. Pollard- 3.0x18 & 3.0x9 Resolute stents in LCX. Unable to cross  of LAD   • CARDIOVASCULAR " STRESS TEST  12/01/2016    L. Myoview- Anterior Infarct with Periinfarct Ischemia.   • CARDIOVASCULAR STRESS TEST  09/26/2017    L. Myoview- Anterior Infarct with PeriInfarct ischemia.. EF > 65%   • COLONOSCOPY     • ECHO - CONVERTED  12/01/2016    EF 60%   • ECHO - CONVERTED  05/06/2019    EF 60%. Mild MR. RVSP- 41 mmHg. Small Pericardial Effusion   • OTHER SURGICAL HISTORY  11/15/2017    cardiac MR- EF 61%, nonviable LAD territory       Current Outpatient Medications   Medication Sig Dispense Refill   • albuterol (PROVENTIL) (2.5 MG/3ML) 0.083% nebulizer solution Take 2.5 mg by nebulization Every 4 (Four) Hours As Needed for wheezing.     • aspirin 81 MG EC tablet Take 81 mg by mouth Daily.     • atorvastatin (LIPITOR) 10 MG tablet Take 1 tablet by mouth Daily. 90 tablet 0   • carvedilol (COREG) 6.25 MG tablet Take 1 tablet by mouth 2 (Two) Times a Day With Meals. 180 tablet 0   • clopidogrel (PLAVIX) 75 MG tablet Take 1 tablet by mouth Daily. 90 tablet 3   • furosemide (LASIX) 20 MG tablet Take 1 tablet by mouth 2 (Two) Times a Day. 180 tablet 3   • isosorbide mononitrate (IMDUR) 30 MG 24 hr tablet Take 1 tablet by mouth Daily. 90 tablet 3   • pantoprazole (PROTONIX) 40 MG EC tablet Take 40 mg by mouth Daily.     • tamsulosin (FLOMAX) 0.4 MG capsule 24 hr capsule Take 1 capsule by mouth Every Night.     • finasteride (PROSCAR) 5 MG tablet Take 5 mg by mouth Daily.     • losartan (COZAAR) 25 MG tablet Take 1 tablet by mouth Daily. 90 tablet 0   • potassium chloride (K-DUR) 10 MEQ CR tablet Take 1 tablet by mouth Daily. 90 tablet 0     No current facility-administered medications for this visit.        Patient has no known allergies.    Past Medical History:   Diagnosis Date   • Marsh's esophagus    • Basal cell carcinoma of skin     surgical removal   • Diaphragmatic hernia    • Diverticulitis    • GERD (gastroesophageal reflux disease)    • Hypertension    • Kidney stones    • Prostatitis    • Pulmonary  fibrosis (CMS/HCC)        Social History     Socioeconomic History   • Marital status:      Spouse name: Not on file   • Number of children: Not on file   • Years of education: Not on file   • Highest education level: Not on file   Tobacco Use   • Smoking status: Never Smoker   • Smokeless tobacco: Never Used   Substance and Sexual Activity   • Alcohol use: No   • Drug use: No   • Sexual activity: Defer       Family History   Problem Relation Age of Onset   • Stroke Mother        Review of Systems   Constitution: Positive for malaise/fatigue and weight loss (Not eating as much.). Negative for decreased appetite.   HENT: Negative for congestion, hoarse voice, nosebleeds and sore throat.    Eyes: Negative for blurred vision.   Cardiovascular: Positive for leg swelling (mild, not a new problem). Negative for chest pain and palpitations.   Respiratory: Positive for cough and shortness of breath. Negative for sputum production.    Endocrine: Negative for cold intolerance and heat intolerance.   Hematologic/Lymphatic: Negative for bleeding problem. Does not bruise/bleed easily.   Skin: Negative for color change, dry skin and itching.   Musculoskeletal: Positive for arthritis and joint pain. Negative for falls, muscle cramps and myalgias.   Gastrointestinal: Negative for abdominal pain, change in bowel habit, dysphagia, heartburn, melena and nausea.   Genitourinary: Negative for dysuria and hematuria.   Neurological: Negative for dizziness and light-headedness.   Psychiatric/Behavioral: Negative for altered mental status and memory loss. The patient does not have insomnia.    Allergic/Immunologic: Negative for hives and persistent infections.        Objective      Labs 4/3/19: Glucose 103, BUN 20, creatinine 1.4, GFR 48, sodium 143, potassium 4.3, calcium 9.1, protein 6.9, albumin 3.9, total bili 0.4, , AST 17, total cholesterol 98, triglycerides 64, HDL 39, LDL 46, TSH 5.41 RBC 4.11 hemoglobin 12.2,  "hematocrit 38, platelets 151    /64 (BP Location: Left arm)   Pulse 74   Ht 172.7 cm (67.99\")   Wt 94 kg (207 lb 3.2 oz)   BMI 31.51 kg/m²     Physical Exam   Constitutional: He is oriented to person, place, and time. He appears well-nourished.   HENT:   Head: Normocephalic.   Eyes: Pupils are equal, round, and reactive to light.   Neck: Normal range of motion.   Cardiovascular: Normal rate, regular rhythm and S1 normal.   No murmur heard.  Pulses:       Radial pulses are 2+ on the right side, and 2+ on the left side.        Dorsalis pedis pulses are 2+ on the right side, and 2+ on the left side.   Slightly loud S2   Pulmonary/Chest: No accessory muscle usage. No respiratory distress. He has decreased breath sounds (slightly). He has no wheezes. He has no rales.   Abdominal: Soft. Bowel sounds are normal.   Musculoskeletal: Normal range of motion.   Neurological: He is alert and oriented to person, place, and time.   Skin: Skin is warm.   Psychiatric: He has a normal mood and affect.        Procedures: none today         Assessment/Plan      Mandeep was seen today for follow-up, shortness of breath and med refill.    Diagnoses and all orders for this visit:    IHD (ischemic heart disease)  -     isosorbide mononitrate (IMDUR) 30 MG 24 hr tablet; Take 1 tablet by mouth Daily.  -     clopidogrel (PLAVIX) 75 MG tablet; Take 1 tablet by mouth Daily.    Essential hypertension  -     potassium chloride (K-DUR) 10 MEQ CR tablet; Take 1 tablet by mouth Daily.  -     losartan (COZAAR) 25 MG tablet; Take 1 tablet by mouth Daily.  -     furosemide (LASIX) 20 MG tablet; Take 1 tablet by mouth 2 (Two) Times a Day.  -     carvedilol (COREG) 6.25 MG tablet; Take 1 tablet by mouth 2 (Two) Times a Day With Meals.    Pure hypercholesterolemia  -     atorvastatin (LIPITOR) 10 MG tablet; Take 1 tablet by mouth Daily.    Pulmonary fibrosis (CMS/HCC)       The report of his recent echocardiogram done in May was reviewed that " showed stable LVEF and no significant valvular issues.  His blood pressure, heart rate, and heart rhythm today are normal.  No change in cardiac medications advised and refills given.    Thank you for sending a copy of April lab report. Lipids well controlled.  Same dose Lipitor advised. Refills given.     Patient's Body mass index is 31.51 kg/m². BMI is above normal parameters. Recommendations include: nutrition counseling. Diet for weight loss encouraged. Goal weight next visit 180 pounds.      Patient reports some improvement of shortness of breath.  He follows with Dr. Powers for management.    From a cardiovascular standpoint Mr. William appears stable at this time.  No repeat cardiac testing advised.  A 6-month follow-up visit scheduled.  Please call sooner for any cardiac concerns.           Electronically signed by BRAULIO Carver,  September 17, 2019 1:39 PM

## 2019-12-03 DIAGNOSIS — I10 ESSENTIAL HYPERTENSION: ICD-10-CM

## 2019-12-03 DIAGNOSIS — E78.00 PURE HYPERCHOLESTEROLEMIA: ICD-10-CM

## 2019-12-03 RX ORDER — POTASSIUM CHLORIDE 750 MG/1
TABLET, EXTENDED RELEASE ORAL
Qty: 90 TABLET | Refills: 2 | Status: SHIPPED | OUTPATIENT
Start: 2019-12-03 | End: 2020-03-17 | Stop reason: SDUPTHER

## 2019-12-03 RX ORDER — ATORVASTATIN CALCIUM 10 MG/1
TABLET, FILM COATED ORAL
Qty: 90 TABLET | Refills: 2 | Status: SHIPPED | OUTPATIENT
Start: 2019-12-03 | End: 2020-03-17 | Stop reason: SDUPTHER

## 2019-12-03 RX ORDER — LOSARTAN POTASSIUM 25 MG/1
TABLET ORAL
Qty: 90 TABLET | Refills: 2 | Status: SHIPPED | OUTPATIENT
Start: 2019-12-03 | End: 2020-03-17 | Stop reason: SDUPTHER

## 2020-03-06 DIAGNOSIS — I10 ESSENTIAL HYPERTENSION: ICD-10-CM

## 2020-03-09 RX ORDER — CARVEDILOL 6.25 MG/1
TABLET ORAL
Qty: 180 TABLET | Refills: 3 | Status: SHIPPED | OUTPATIENT
Start: 2020-03-09 | End: 2020-09-17 | Stop reason: SDUPTHER

## 2020-03-17 ENCOUNTER — OFFICE VISIT (OUTPATIENT)
Dept: CARDIOLOGY | Facility: CLINIC | Age: 80
End: 2020-03-17

## 2020-03-17 VITALS
SYSTOLIC BLOOD PRESSURE: 110 MMHG | DIASTOLIC BLOOD PRESSURE: 58 MMHG | WEIGHT: 215 LBS | BODY MASS INDEX: 32.58 KG/M2 | HEIGHT: 68 IN | HEART RATE: 72 BPM

## 2020-03-17 DIAGNOSIS — J84.10 PULMONARY FIBROSIS (HCC): ICD-10-CM

## 2020-03-17 DIAGNOSIS — E78.00 PURE HYPERCHOLESTEROLEMIA: ICD-10-CM

## 2020-03-17 DIAGNOSIS — I10 ESSENTIAL HYPERTENSION: ICD-10-CM

## 2020-03-17 DIAGNOSIS — I25.10 CORONARY ARTERY DISEASE INVOLVING NATIVE CORONARY ARTERY OF NATIVE HEART WITHOUT ANGINA PECTORIS: Primary | ICD-10-CM

## 2020-03-17 PROCEDURE — 99214 OFFICE O/P EST MOD 30 MIN: CPT | Performed by: NURSE PRACTITIONER

## 2020-03-17 RX ORDER — LOSARTAN POTASSIUM 25 MG/1
25 TABLET ORAL DAILY
Qty: 90 TABLET | Refills: 2 | Status: SHIPPED | OUTPATIENT
Start: 2020-03-17 | End: 2020-09-17 | Stop reason: SDUPTHER

## 2020-03-17 RX ORDER — ATORVASTATIN CALCIUM 10 MG/1
10 TABLET, FILM COATED ORAL DAILY
Qty: 90 TABLET | Refills: 2 | Status: SHIPPED | OUTPATIENT
Start: 2020-03-17 | End: 2020-09-17 | Stop reason: SDUPTHER

## 2020-03-17 RX ORDER — MELOXICAM 15 MG/1
15 TABLET ORAL DAILY
COMMUNITY
End: 2021-10-14

## 2020-03-17 RX ORDER — POTASSIUM CHLORIDE 750 MG/1
10 TABLET, EXTENDED RELEASE ORAL DAILY
Qty: 90 TABLET | Refills: 2 | Status: SHIPPED | OUTPATIENT
Start: 2020-03-17 | End: 2020-09-17 | Stop reason: SDUPTHER

## 2020-03-17 NOTE — PROGRESS NOTES
"Chief Complaint   Patient presents with   • Follow-up     Cardiac management.   • Lab     Last labs a month ago per PCP.   • Shortness of Breath     He reports same as before, continues to take neb tx. Follows with Dr Powers.   • Fatigue     He reports same as before, has some problems with balance.   • Med Refill     Needs refills on Potassium, Lipitor and Losartan-90 day.     Subjective       Mandeep William is a 79 y.o. male with pulmonary fibrosis, HTN and Marsh's esophagus who was referred in November 2016 for increasing dyspnea and cough. Cardiac stress showed anterior infarct with christa-infarct ischemia, he was started on Imdur and cath advised. Cath showed 80-90% occlusion of the circumflex and 2 stents were placed.  The LAD was found to be chronically occluded but no intervention was able to be done as it could not be reached, medical intervention was advised. He was referred to Dr. Philip for possible heart cath to open up LAD. Dr. Philip ordered cardiac MR on 11/25/17 which showed most likely nonviable myocardium in the LAD territory. LV ejection fraction was noted as 61%. It was decided to not proceed with cath. Repeat echo in May showed LVEF remains normal and PA pressure slightly increased, 41 mmHg. He follows with Dr. Powers for pulmonary management.    He came today for regular follow up. He feels like he is holding his own. No increase in chest pain or shortness of breath. He continues to use nebulizer and follows with Dr. Powers for pulmonary fibrosis. He does not use O2 or CPAP. Sleeps \"well\". He does feel generally weak and mildly unsteady secondary to fibromyalgia. He is able to complete ADLs without much difficulty. He lives alone but daughter visits, cooks meals, etc. Refills requested. Labs 4/2019 showed GFR 48, LDL/HDL 46/39.    HPI         Cardiac History:    Past Surgical History:   Procedure Laterality Date   • CARDIAC CATHETERIZATION  12/19/2016    100% LAD. 80% OM2   • CARDIAC CATHETERIZATION  " 12/21/2016    Dr. Pollard- 3.0x18 & 3.0x9 Resolute stents in LCX. Unable to cross  of LAD   • CARDIOVASCULAR STRESS TEST  12/01/2016    L. Myoview- Anterior Infarct with Periinfarct Ischemia.   • CARDIOVASCULAR STRESS TEST  09/26/2017    L. Myoview- Anterior Infarct with PeriInfarct ischemia.. EF > 65%   • COLONOSCOPY     • ECHO - CONVERTED  12/01/2016    EF 60%   • ECHO - CONVERTED  05/06/2019    EF 60%. Mild MR. RVSP- 41 mmHg. Small Pericardial Effusion   • OTHER SURGICAL HISTORY  11/15/2017    cardiac MR- EF 61%, nonviable LAD territory       Current Outpatient Medications   Medication Sig Dispense Refill   • albuterol (PROVENTIL) (2.5 MG/3ML) 0.083% nebulizer solution Take 2.5 mg by nebulization Every 4 (Four) Hours As Needed for wheezing.     • aspirin 81 MG EC tablet Take 81 mg by mouth Daily.     • atorvastatin (LIPITOR) 10 MG tablet Take 1 tablet by mouth Daily. 90 tablet 2   • carvedilol (COREG) 6.25 MG tablet TAKE 1 TABLET TWICE DAILY WITH MEALS 180 tablet 3   • clopidogrel (PLAVIX) 75 MG tablet Take 1 tablet by mouth Daily. 90 tablet 3   • finasteride (PROSCAR) 5 MG tablet Take 5 mg by mouth Daily.     • furosemide (LASIX) 20 MG tablet Take 1 tablet by mouth 2 (Two) Times a Day. 180 tablet 3   • isosorbide mononitrate (IMDUR) 30 MG 24 hr tablet Take 1 tablet by mouth Daily. 90 tablet 3   • losartan (COZAAR) 25 MG tablet Take 1 tablet by mouth Daily. 90 tablet 2   • meloxicam (MOBIC) 15 MG tablet Take 15 mg by mouth Daily.     • pantoprazole (PROTONIX) 40 MG EC tablet Take 40 mg by mouth Daily.     • potassium chloride (K-DUR,KLOR-CON) 10 MEQ CR tablet Take 1 tablet by mouth Daily. 90 tablet 2   • tamsulosin (FLOMAX) 0.4 MG capsule 24 hr capsule Take 1 capsule by mouth Every Night.       No current facility-administered medications for this visit.      Patient has no known allergies.    Past Medical History:   Diagnosis Date   • Marsh's esophagus    • Basal cell carcinoma of skin     surgical removal  "  • Diaphragmatic hernia    • Diverticulitis    • GERD (gastroesophageal reflux disease)    • Hypertension    • Kidney stones    • Prostatitis    • Pulmonary fibrosis (CMS/HCC)      Social History     Socioeconomic History   • Marital status:      Spouse name: Not on file   • Number of children: Not on file   • Years of education: Not on file   • Highest education level: Not on file   Tobacco Use   • Smoking status: Never Smoker   • Smokeless tobacco: Never Used   Substance and Sexual Activity   • Alcohol use: No   • Drug use: No   • Sexual activity: Defer     Family History   Problem Relation Age of Onset   • Stroke Mother      Review of Systems   Constitution: Positive for malaise/fatigue and weight gain (up 8 lb). Negative for decreased appetite.   HENT: Negative.    Eyes: Negative.    Cardiovascular: Positive for dyspnea on exertion. Negative for chest pain, leg swelling, palpitations and syncope.   Respiratory: Positive for cough (occasional ), shortness of breath and sputum production (clear ). Negative for sleep disturbances due to breathing.    Endocrine: Negative.    Hematologic/Lymphatic: Negative.    Skin: Negative.    Musculoskeletal: Positive for arthritis, back pain, myalgias (fibromyalgia) and stiffness.   Gastrointestinal: Negative for abdominal pain and melena.   Genitourinary: Negative for dysuria and hematuria.   Neurological: Negative for dizziness.   Psychiatric/Behavioral: Negative for altered mental status and depression.   Allergic/Immunologic: Negative.       Diabetes- No  Thyroid-normal    Objective     /58 (BP Location: Right arm)   Pulse 72   Ht 172.7 cm (67.99\")   Wt 97.5 kg (215 lb)   BMI 32.70 kg/m²     Physical Exam   Constitutional: He is oriented to person, place, and time. He appears well-developed and well-nourished. He appears distressed.   HENT:   Head: Normocephalic.   Eyes: Pupils are equal, round, and reactive to light.   Neck: Normal range of motion. "   Cardiovascular: Normal rate, regular rhythm and intact distal pulses.   Pulmonary/Chest: Effort normal and breath sounds normal. He has no wheezes.   Abdominal: Soft. Bowel sounds are normal.   Musculoskeletal: Normal range of motion.   Neurological: He is alert and oriented to person, place, and time.   Skin: Skin is dry. He is not diaphoretic.      Procedures          Assessment/Plan      Mandeep was seen today for follow-up, lab, shortness of breath, fatigue and med refill.    Diagnoses and all orders for this visit:    Coronary artery disease involving native coronary artery of native heart without angina pectoris    Essential hypertension  -     losartan (COZAAR) 25 MG tablet; Take 1 tablet by mouth Daily.  -     potassium chloride (K-DUR,KLOR-CON) 10 MEQ CR tablet; Take 1 tablet by mouth Daily.    Pure hypercholesterolemia  -     atorvastatin (LIPITOR) 10 MG tablet; Take 1 tablet by mouth Daily.    Pulmonary fibrosis (CMS/HCC)      1. CAD- stable without anginal symptoms. Continue medical therapy for chronic occlusion of LAD. Continue aspirin, Plavix, statin, beta blocker, Imdur.     2. HTN- very well controlled, continue losartan, carvedilol, Lasix. Limit sodium. Weight loss.     3. Hypercholesterolemia- well controlled with Lipitor. LDL and HDL at goal. Continue low carbohydrate, higher protein diet.     4. Pulmonary fibrosis/PHTN- shortness of breath is stable, PA pressure 41 on most recent echo. Continue nebulizer. Continue following with Dr. Powers.     He appears stable from a cardiac standpoint. No changes made today as he has no new symptoms. Thank you for providing labs. Refills sent for losartan, Kcl, atorvastatin. He will try to reduce his weight by 5-10 pounds by next visit. We will see him back in six months or sooner if needed.   Patient's Body mass index is 32.7 kg/m². BMI is above normal parameters. Recommendations include: nutrition counseling.                   Electronically signed by Dominique ALEGRIA  BRAULIO Valverde,  March 17, 2020 11:31

## 2020-06-27 DIAGNOSIS — I25.9 IHD (ISCHEMIC HEART DISEASE): ICD-10-CM

## 2020-06-29 RX ORDER — ISOSORBIDE MONONITRATE 30 MG/1
TABLET, EXTENDED RELEASE ORAL
Qty: 90 TABLET | Refills: 3 | Status: SHIPPED | OUTPATIENT
Start: 2020-06-29 | End: 2020-09-17 | Stop reason: SDUPTHER

## 2020-07-10 DIAGNOSIS — I10 ESSENTIAL HYPERTENSION: ICD-10-CM

## 2020-07-13 RX ORDER — FUROSEMIDE 20 MG/1
TABLET ORAL
Qty: 180 TABLET | Refills: 3 | Status: SHIPPED | OUTPATIENT
Start: 2020-07-13 | End: 2020-09-17 | Stop reason: SDUPTHER

## 2020-09-17 ENCOUNTER — OFFICE VISIT (OUTPATIENT)
Dept: CARDIOLOGY | Facility: CLINIC | Age: 80
End: 2020-09-17

## 2020-09-17 VITALS
BODY MASS INDEX: 29.52 KG/M2 | HEIGHT: 68 IN | TEMPERATURE: 98.4 F | DIASTOLIC BLOOD PRESSURE: 64 MMHG | WEIGHT: 194.8 LBS | SYSTOLIC BLOOD PRESSURE: 112 MMHG | HEART RATE: 72 BPM

## 2020-09-17 DIAGNOSIS — I10 ESSENTIAL HYPERTENSION: ICD-10-CM

## 2020-09-17 DIAGNOSIS — I25.9 IHD (ISCHEMIC HEART DISEASE): ICD-10-CM

## 2020-09-17 DIAGNOSIS — E78.00 PURE HYPERCHOLESTEROLEMIA: ICD-10-CM

## 2020-09-17 PROCEDURE — 99214 OFFICE O/P EST MOD 30 MIN: CPT | Performed by: NURSE PRACTITIONER

## 2020-09-17 RX ORDER — ISOSORBIDE MONONITRATE 30 MG/1
30 TABLET, EXTENDED RELEASE ORAL DAILY
Qty: 90 TABLET | Refills: 3 | Status: SHIPPED | OUTPATIENT
Start: 2020-09-17 | End: 2021-03-26 | Stop reason: SDUPTHER

## 2020-09-17 RX ORDER — FUROSEMIDE 20 MG/1
20 TABLET ORAL 2 TIMES DAILY
Qty: 180 TABLET | Refills: 3 | Status: SHIPPED | OUTPATIENT
Start: 2020-09-17 | End: 2021-03-26 | Stop reason: SDUPTHER

## 2020-09-17 RX ORDER — POTASSIUM CHLORIDE 750 MG/1
10 TABLET, EXTENDED RELEASE ORAL DAILY
Qty: 90 TABLET | Refills: 3 | Status: SHIPPED | OUTPATIENT
Start: 2020-09-17 | End: 2021-03-26 | Stop reason: SDUPTHER

## 2020-09-17 RX ORDER — CLOPIDOGREL BISULFATE 75 MG/1
75 TABLET ORAL DAILY
Qty: 90 TABLET | Refills: 3 | Status: SHIPPED | OUTPATIENT
Start: 2020-09-17 | End: 2021-03-26 | Stop reason: SDUPTHER

## 2020-09-17 RX ORDER — LOSARTAN POTASSIUM 25 MG/1
25 TABLET ORAL DAILY
Qty: 90 TABLET | Refills: 3 | Status: SHIPPED | OUTPATIENT
Start: 2020-09-17 | End: 2021-03-26 | Stop reason: SDUPTHER

## 2020-09-17 RX ORDER — CARVEDILOL 6.25 MG/1
6.25 TABLET ORAL 2 TIMES DAILY WITH MEALS
Qty: 180 TABLET | Refills: 3 | Status: SHIPPED | OUTPATIENT
Start: 2020-09-17 | End: 2021-03-26 | Stop reason: SDUPTHER

## 2020-09-17 RX ORDER — ATORVASTATIN CALCIUM 10 MG/1
10 TABLET, FILM COATED ORAL DAILY
Qty: 90 TABLET | Refills: 3 | Status: SHIPPED | OUTPATIENT
Start: 2020-09-17 | End: 2021-03-26 | Stop reason: SDUPTHER

## 2020-09-17 NOTE — PROGRESS NOTES
"Chief Complaint   Patient presents with   • Follow-up     Cardiac management.   • Lab     Has copy of most recent labs.   • Shortness of Breath     He reports same as before, follows with Dr Powers.   • Fatigue     Having weakness and no energy.   • Med Refill     will need refills on cardiac medications, patient did not bring medication list and will call office back with list.   • Weight Loss     Has not been eating as much.     Subjective       Mandeep William is a 79 y.o. male with pulmonary fibrosis, HTN and Marsh's esophagus who was referred in November 2016 for increasing dyspnea and cough. Stress showed anterior wall infarct with christa-infarct ischemia. Imdur started, cath cath advised showing 80-90% LCX, received 2 stents. The LAD was found to be chronically occluded. No intervention was able to be done as it could not be reached, medical management advised. He was referred to Dr. Philip for possible cath to open up LAD. Dr. Philip ordered cardiac MR on 11/25/17 which showed most likely nonviable myocardium in the LAD territory. LVEF noted as 61%. It was decided to not proceed with cath. Repeat echo in May 2019 showed LVEF remained normal and PA pressure slightly increased, 41 mmHg. He follows with Dr. Powers for pulmonary management. Does not use O2.     He returns today for regular follow up. He denies anginal symptoms. Continues to have shortness of breath, productive cough but does not feel has worsened. He uses nebulizer \"every day or two\". He has been watching diet, reducing portions, has lost 21 lbs. Feeling better regarding effort tolerance. He walks to his mailbox, 300-400 ft but otherwise \"staying in the house because of this virus\". Labs brought today showed on 7/7/20 TC 87, LDL 40, HDL 36, Tri 54, A1C 6.0%, GFR 55.          Cardiac History:    Past Surgical History:   Procedure Laterality Date   • CARDIAC CATHETERIZATION  12/19/2016    100% LAD. 80% OM2   • CARDIAC CATHETERIZATION  12/21/2016    Dr." Atul- 3.0x18 & 3.0x9 Resolute stents in LCX. Unable to cross  of LAD   • CARDIOVASCULAR STRESS TEST  12/01/2016    L. Myoview- Anterior Infarct with Periinfarct Ischemia.   • CARDIOVASCULAR STRESS TEST  09/26/2017    L. Myoview- Anterior Infarct with PeriInfarct ischemia.. EF > 65%   • COLONOSCOPY     • ECHO - CONVERTED  12/01/2016    EF 60%   • ECHO - CONVERTED  05/06/2019    EF 60%. Mild MR. RVSP- 41 mmHg. Small Pericardial Effusion   • OTHER SURGICAL HISTORY  11/15/2017    cardiac MR- EF 61%, nonviable LAD territory     Current Outpatient Medications   Medication Sig Dispense Refill   • albuterol (PROVENTIL) (2.5 MG/3ML) 0.083% nebulizer solution Take 2.5 mg by nebulization Every 4 (Four) Hours As Needed for wheezing.     • aspirin 81 MG EC tablet Take 81 mg by mouth Daily.     • atorvastatin (LIPITOR) 10 MG tablet Take 1 tablet by mouth Daily. 90 tablet 3   • carvedilol (COREG) 6.25 MG tablet Take 1 tablet by mouth 2 (Two) Times a Day With Meals. 180 tablet 3   • clopidogrel (PLAVIX) 75 MG tablet Take 1 tablet by mouth Daily. 90 tablet 3   • finasteride (PROSCAR) 5 MG tablet Take 5 mg by mouth Daily.     • furosemide (LASIX) 20 MG tablet Take 1 tablet by mouth 2 (Two) Times a Day. 180 tablet 3   • isosorbide mononitrate (IMDUR) 30 MG 24 hr tablet Take 1 tablet by mouth Daily. 90 tablet 3   • losartan (COZAAR) 25 MG tablet Take 1 tablet by mouth Daily. 90 tablet 3   • meloxicam (MOBIC) 15 MG tablet Take 15 mg by mouth Daily.     • pantoprazole (PROTONIX) 40 MG EC tablet Take 40 mg by mouth Daily.     • potassium chloride (K-DUR,KLOR-CON) 10 MEQ CR tablet Take 1 tablet by mouth Daily. 90 tablet 3   • tamsulosin (FLOMAX) 0.4 MG capsule 24 hr capsule Take 1 capsule by mouth Every Night.       No current facility-administered medications for this visit.      Patient has no known allergies.    Past Medical History:   Diagnosis Date   • Marsh's esophagus    • Basal cell carcinoma of skin     surgical removal  "  • Diaphragmatic hernia    • Diverticulitis    • GERD (gastroesophageal reflux disease)    • Hypertension    • Kidney stones    • Prostatitis    • Pulmonary fibrosis (CMS/HCC)      Social History     Socioeconomic History   • Marital status:      Spouse name: Not on file   • Number of children: Not on file   • Years of education: Not on file   • Highest education level: Not on file   Tobacco Use   • Smoking status: Never Smoker   • Smokeless tobacco: Never Used   Substance and Sexual Activity   • Alcohol use: No   • Drug use: No   • Sexual activity: Defer     Family History   Problem Relation Age of Onset   • Stroke Mother      Review of Systems   Constitution: Positive for weight loss (down 21 lbs in six months ). Negative for decreased appetite and malaise/fatigue.   HENT: Negative.    Eyes: Negative for blurred vision.   Cardiovascular: Positive for dyspnea on exertion. Negative for chest pain, leg swelling, palpitations and syncope.   Respiratory: Positive for cough and shortness of breath. Negative for sleep disturbances due to breathing.    Endocrine: Negative.    Hematologic/Lymphatic: Negative for bleeding problem. Does not bruise/bleed easily.   Skin: Negative.    Musculoskeletal: Positive for muscle weakness and myalgias. Negative for falls.   Gastrointestinal: Negative for abdominal pain, heartburn and melena.   Genitourinary: Negative for hematuria.   Neurological: Negative for dizziness and light-headedness.   Psychiatric/Behavioral: Negative for altered mental status.   Allergic/Immunologic: Negative.       Objective     /64 (BP Location: Left arm)   Pulse 72   Temp 98.4 °F (36.9 °C)   Ht 172.7 cm (67.99\")   Wt 88.4 kg (194 lb 12.8 oz)   BMI 29.63 kg/m²     Vitals signs and nursing note reviewed.   Constitutional:       General: Not in acute distress.     Appearance: Healthy appearance. Well-developed and not in distress. Not diaphoretic.   Eyes:      Pupils: Pupils are equal, round, " and reactive to light.   HENT:      Head: Normocephalic.   Neck:      Musculoskeletal: Normal range of motion.   Pulmonary:      Effort: Pulmonary effort is normal. No respiratory distress.      Breath sounds: Diffuse Rales (crackling LLL) present.   Cardiovascular:      PMI at left midclavicular line. Normal rate. Regular rhythm.      Murmurs: There is a grade 1/6 high frequency blowing holosystolic murmur at the apex.      Midsystolic click.   Pulses:     Intact distal pulses.   Edema:     Peripheral edema absent.   Abdominal:      General: Bowel sounds are normal.      Palpations: Abdomen is soft.   Musculoskeletal: Normal range of motion.   Skin:     General: Skin is warm and dry.   Neurological:      Mental Status: Alert and oriented to person, place, and time.        Procedures          Problem List Items Addressed This Visit        Cardiovascular and Mediastinum    HTN (hypertension)    Relevant Medications    carvedilol (COREG) 6.25 MG tablet    losartan (COZAAR) 25 MG tablet    furosemide (LASIX) 20 MG tablet    potassium chloride (K-DUR,KLOR-CON) 10 MEQ CR tablet    Hyperlipemia    Relevant Medications    atorvastatin (LIPITOR) 10 MG tablet      Other Visit Diagnoses     IHD (ischemic heart disease)        Relevant Medications    carvedilol (COREG) 6.25 MG tablet    isosorbide mononitrate (IMDUR) 30 MG 24 hr tablet    clopidogrel (PLAVIX) 75 MG tablet         1. CAD- stable, no angina. S/p stenting x 2 LCX, 2016. Chronically occluded LAD. Continue aspirin, Plavix, statin, BB, long-acting nitrate.     2. HTN- well controlled with losartan, carvedilol, Lasix. Limit Na. He has lost 21 lb reducing portions. Continue heart healthy diet, limit Na.     3. Hyperlipidemia- LDL/HDL 40/36. Excellent lipid control with Lipitor. Continue the same. Thank you for providing labs.     4. Pulmonary fibrosis/PHTN- no worsening SOB, does not wear O2 or CPAP. Follows with Dr. Powers. Will repeat echo next Spring to reassess PA  pressure. Continue nebulizer PRN.     He was congratulated on his weight loss efforts. He appears stable from a cardiac standpoint. We will see him back in six months.     Patient's Body mass index is 29.63 kg/m². BMI is above normal parameters. Recommendations include: nutrition counseling.               Electronically signed by BRAULIO Henderson,  September 17, 2020 10:37 EDT

## 2020-10-28 NOTE — PROGRESS NOTES
Please assess how he is feeling. He has a very small effusion. Detail Level: Zone Samples Given: Eucerin spot treatment

## 2021-03-22 ENCOUNTER — OFFICE VISIT (OUTPATIENT)
Dept: CARDIOLOGY | Facility: CLINIC | Age: 81
End: 2021-03-22

## 2021-03-22 VITALS
HEIGHT: 68 IN | WEIGHT: 194.2 LBS | HEART RATE: 60 BPM | DIASTOLIC BLOOD PRESSURE: 62 MMHG | BODY MASS INDEX: 29.43 KG/M2 | SYSTOLIC BLOOD PRESSURE: 110 MMHG | TEMPERATURE: 97.1 F

## 2021-03-22 DIAGNOSIS — I27.20 PULMONARY HYPERTENSION (HCC): ICD-10-CM

## 2021-03-22 DIAGNOSIS — E78.00 PURE HYPERCHOLESTEROLEMIA: ICD-10-CM

## 2021-03-22 DIAGNOSIS — I25.118 CORONARY ARTERY DISEASE INVOLVING NATIVE CORONARY ARTERY OF NATIVE HEART WITH OTHER FORM OF ANGINA PECTORIS (HCC): ICD-10-CM

## 2021-03-22 DIAGNOSIS — I10 ESSENTIAL HYPERTENSION: Primary | ICD-10-CM

## 2021-03-22 DIAGNOSIS — J84.10 PULMONARY FIBROSIS (HCC): ICD-10-CM

## 2021-03-22 PROCEDURE — 99214 OFFICE O/P EST MOD 30 MIN: CPT | Performed by: NURSE PRACTITIONER

## 2021-03-22 NOTE — PROGRESS NOTES
Chief Complaint   Patient presents with   • Follow-up     Cardiac management   • Lab     Has copy of most recent labs.   • Shortness of Breath     Same as before. Has been coughing more with thick secretions.   • Med Refill     Will need refills, he did not bring medication list, will call office back with list.     Subjective       Mandeep William is a 80 y.o. male with pulmonary fibrosis, HTN and Marsh's esophagus who was referred in November 2016 for increasing dyspnea and cough. Stress showed anterior wall infarct with christa-infarct ischemia. Imdur started, cardiac cath advised showing 80-90% LCX, received 2 stents. The LAD was found to be chronically occluded. No intervention was able to be done as it could not be reached, medical management advised. He was referred to Dr. Philip for possible cath to open up LAD. Dr. Philip ordered cardiac MR on 11/25/17 which showed most likely nonviable myocardium in the LAD territory. LVEF noted as 61%. It was decided to not proceed with cath. Repeat echo in May 2019 showed LVEF remained normal and PA pressure slightly increased, 41 mmHg. He follows with Dr. Powers for pulmonary management. Does not use O2.     He returns today for regular follow-up visit.  He denies having any chest discomfort but does admit to increasing shortness of breath, dyspnea on exertion, increased pulmonary secretions.  He notes significant coughing and congestion in early morning which clears after about 30 minutes.  He feels tired and fatigued most of the time but can complete ADLs as long as he paces himself.  He brought a copy of his recent labs showing on 12/23/2020, glucose 102, A1c 5.7%, GFR 57, albumin 3.6, alk phos 133, TC 92, TRI 57, HDL 39, LDL 40, TSH 4.59 with normal T3 and T4.  CBC normal with exception of platelets at 120.  He denies bruising or bleeding.         Cardiac History:    Past Surgical History:   Procedure Laterality Date   • CARDIAC CATHETERIZATION  12/19/2016    100% LAD. 80%  OM2   • CARDIAC CATHETERIZATION  12/21/2016    Dr. Pollard- 3.0x18 & 3.0x9 Resolute stents in LCX. Unable to cross  of LAD   • CARDIOVASCULAR STRESS TEST  12/01/2016    L. Myoview- Anterior Infarct with Periinfarct Ischemia.   • CARDIOVASCULAR STRESS TEST  09/26/2017    L. Myoview- Anterior Infarct with PeriInfarct ischemia.. EF > 65%   • COLONOSCOPY     • ECHO - CONVERTED  12/01/2016    EF 60%   • ECHO - CONVERTED  05/06/2019    EF 60%. Mild MR. RVSP- 41 mmHg. Small Pericardial Effusion   • OTHER SURGICAL HISTORY  11/15/2017    cardiac MR- EF 61%, nonviable LAD territory     Current Outpatient Medications   Medication Sig Dispense Refill   • albuterol (PROVENTIL) (2.5 MG/3ML) 0.083% nebulizer solution Take 2.5 mg by nebulization Every 4 (Four) Hours As Needed for wheezing.     • aspirin 81 MG EC tablet Take 81 mg by mouth Daily.     • atorvastatin (LIPITOR) 10 MG tablet Take 1 tablet by mouth Daily. 90 tablet 3   • carvedilol (COREG) 6.25 MG tablet Take 1 tablet by mouth 2 (Two) Times a Day With Meals. 180 tablet 3   • clopidogrel (PLAVIX) 75 MG tablet Take 1 tablet by mouth Daily. 90 tablet 3   • finasteride (PROSCAR) 5 MG tablet Take 5 mg by mouth Daily.     • furosemide (LASIX) 20 MG tablet Take 1 tablet by mouth 2 (Two) Times a Day. 180 tablet 3   • isosorbide mononitrate (IMDUR) 30 MG 24 hr tablet Take 1 tablet by mouth Daily. 90 tablet 3   • losartan (COZAAR) 25 MG tablet Take 1 tablet by mouth Daily. 90 tablet 3   • meloxicam (MOBIC) 15 MG tablet Take 15 mg by mouth Daily.     • pantoprazole (PROTONIX) 40 MG EC tablet Take 40 mg by mouth Daily.     • potassium chloride (K-DUR,KLOR-CON) 10 MEQ CR tablet Take 1 tablet by mouth Daily. 90 tablet 3   • tamsulosin (FLOMAX) 0.4 MG capsule 24 hr capsule Take 1 capsule by mouth Every Night.       No current facility-administered medications for this visit.       Patient has no known allergies.    Past Medical History:   Diagnosis Date   • Marsh's esophagus    •  "Basal cell carcinoma of skin     surgical removal   • Diaphragmatic hernia    • Diverticulitis    • GERD (gastroesophageal reflux disease)    • Hypertension    • Kidney stones    • Prostatitis    • Pulmonary fibrosis (CMS/HCC)      Social History     Socioeconomic History   • Marital status:      Spouse name: Not on file   • Number of children: Not on file   • Years of education: Not on file   • Highest education level: Not on file   Tobacco Use   • Smoking status: Never Smoker   • Smokeless tobacco: Never Used   Vaping Use   • Vaping Use: Never used   Substance and Sexual Activity   • Alcohol use: No   • Drug use: No   • Sexual activity: Defer     Family History   Problem Relation Age of Onset   • Stroke Mother      Review of Systems   Constitutional: Positive for malaise/fatigue. Negative for decreased appetite, weight gain and weight loss.   HENT: Negative.    Eyes: Negative for blurred vision.   Cardiovascular: Positive for dyspnea on exertion. Negative for chest pain, leg swelling, palpitations and syncope.   Respiratory: Positive for cough, shortness of breath and sputum production. Negative for sleep disturbances due to breathing.    Endocrine: Negative.    Hematologic/Lymphatic: Negative for bleeding problem. Does not bruise/bleed easily.   Skin: Negative.    Musculoskeletal: Negative for falls and myalgias.   Gastrointestinal: Negative for abdominal pain, heartburn and melena.   Genitourinary: Negative for hematuria.   Neurological: Negative for dizziness and light-headedness.   Psychiatric/Behavioral: Negative for altered mental status.   Allergic/Immunologic: Negative.       Objective     /62 (BP Location: Left arm)   Pulse 60   Temp 97.1 °F (36.2 °C)   Ht 172.7 cm (67.99\")   Wt 88.1 kg (194 lb 3.2 oz)   BMI 29.53 kg/m²     Vitals and nursing note reviewed.   Constitutional:       General: Not in acute distress.     Appearance: Well-developed. Not diaphoretic.   Eyes:      Pupils: Pupils " are equal, round, and reactive to light.   HENT:      Head: Normocephalic.   Pulmonary:      Effort: Pulmonary effort is normal. No respiratory distress.      Breath sounds: Normal breath sounds.   Cardiovascular:      Normal rate. Regular rhythm.      Murmurs: There is a grade 1 to 2/6 systolic murmur at the apex.   Pulses:     Intact distal pulses.   Edema:     Ankle: bilateral trace edema of the ankle.  Abdominal:      General: Bowel sounds are normal.      Palpations: Abdomen is soft.   Musculoskeletal: Normal range of motion.      Cervical back: Normal range of motion. Skin:     General: Skin is warm and dry.   Neurological:      Mental Status: Alert and oriented to person, place, and time.        Procedures          Problem List Items Addressed This Visit        Cardiac and Vasculature    CAD (coronary artery disease)    HTN (hypertension) - Primary    Relevant Orders    Adult Transthoracic Echo Complete W/ Cont if Necessary Per Protocol    Hyperlipemia       Pulmonary and Pneumonias    Pulmonary fibrosis (CMS/HCC)    Relevant Orders    Adult Transthoracic Echo Complete W/ Cont if Necessary Per Protocol    Pulmonary hypertension (CMS/HCC)    Relevant Orders    Adult Transthoracic Echo Complete W/ Cont if Necessary Per Protocol         1.  CAD-stable, no angina. S/p stenting x 2 LCX, 2016. Chronically occluded LAD. Continue aspirin, Plavix, statin, BB, long-acting nitrate.     2. HTN- well controlled with losartan, carvedilol, Lasix. Limit Na.  He has maintained his weight loss.    3.  Hyperlipidemia-LDL/HDL 40/39.  Lipids remain very well controlled with Lipitor.  Continue the same.  Thank you for providing a copy of his labs.    4.  Pulmonary fibrosis/PHTN-he reports increasing shortness of breath, productive cough.  He does not wear O2 or CPAP.  He follows with pulmonology/Dr. Powers but has not seen her recently.  Will repeat echocardiogram to reevaluate PA pressure, LVEF and right heart chambers.    No  changes made today.  He will call back with his medication list and refills will be sent.  We reviewed his cardiac cath findings as well as his last echo.  Follow-up in 6 months or sooner as needed.    Patient's Body mass index is 29.53 kg/m². BMI is above normal parameters. Recommendations include: nutrition counseling.                   Electronically signed by BRAULIO Henderson,  March 22, 2021 10:56 EDT

## 2021-03-26 DIAGNOSIS — E78.00 PURE HYPERCHOLESTEROLEMIA: ICD-10-CM

## 2021-03-26 DIAGNOSIS — I25.9 IHD (ISCHEMIC HEART DISEASE): ICD-10-CM

## 2021-03-26 DIAGNOSIS — I10 ESSENTIAL HYPERTENSION: ICD-10-CM

## 2021-03-26 RX ORDER — FUROSEMIDE 20 MG/1
20 TABLET ORAL 2 TIMES DAILY
Qty: 180 TABLET | Refills: 3 | Status: SHIPPED | OUTPATIENT
Start: 2021-03-26 | End: 2021-10-14 | Stop reason: SDUPTHER

## 2021-03-26 RX ORDER — CARVEDILOL 6.25 MG/1
6.25 TABLET ORAL 2 TIMES DAILY WITH MEALS
Qty: 180 TABLET | Refills: 3 | Status: SHIPPED | OUTPATIENT
Start: 2021-03-26 | End: 2021-10-14 | Stop reason: SDUPTHER

## 2021-03-26 RX ORDER — LOSARTAN POTASSIUM 25 MG/1
25 TABLET ORAL DAILY
Qty: 90 TABLET | Refills: 3 | Status: SHIPPED | OUTPATIENT
Start: 2021-03-26 | End: 2021-10-14 | Stop reason: SDUPTHER

## 2021-03-26 RX ORDER — ISOSORBIDE MONONITRATE 30 MG/1
30 TABLET, EXTENDED RELEASE ORAL DAILY
Qty: 90 TABLET | Refills: 3 | Status: SHIPPED | OUTPATIENT
Start: 2021-03-26 | End: 2021-10-14 | Stop reason: SDUPTHER

## 2021-03-26 RX ORDER — CLOPIDOGREL BISULFATE 75 MG/1
75 TABLET ORAL DAILY
Qty: 90 TABLET | Refills: 3 | Status: SHIPPED | OUTPATIENT
Start: 2021-03-26 | End: 2021-10-14 | Stop reason: SDUPTHER

## 2021-03-26 RX ORDER — ATORVASTATIN CALCIUM 10 MG/1
10 TABLET, FILM COATED ORAL DAILY
Qty: 90 TABLET | Refills: 3 | Status: SHIPPED | OUTPATIENT
Start: 2021-03-26 | End: 2021-10-14 | Stop reason: SDUPTHER

## 2021-03-26 RX ORDER — POTASSIUM CHLORIDE 750 MG/1
10 TABLET, EXTENDED RELEASE ORAL DAILY
Qty: 90 TABLET | Refills: 3 | Status: SHIPPED | OUTPATIENT
Start: 2021-03-26 | End: 2021-10-14 | Stop reason: SDUPTHER

## 2021-03-26 NOTE — TELEPHONE ENCOUNTER
Patient called with current medication list. Current medication list abstracted to chart. Patient needs refills on medications sent to pharmacy.

## 2021-04-06 ENCOUNTER — APPOINTMENT (OUTPATIENT)
Dept: CARDIOLOGY | Facility: HOSPITAL | Age: 81
End: 2021-04-06

## 2021-04-12 ENCOUNTER — HOSPITAL ENCOUNTER (OUTPATIENT)
Dept: CARDIOLOGY | Facility: HOSPITAL | Age: 81
Discharge: HOME | End: 2021-04-12
Admitting: NURSE PRACTITIONER

## 2021-04-12 VITALS — BODY MASS INDEX: 29.44 KG/M2 | WEIGHT: 194.22 LBS | HEIGHT: 68 IN

## 2021-04-12 DIAGNOSIS — J84.10 PULMONARY FIBROSIS (HCC): ICD-10-CM

## 2021-04-12 DIAGNOSIS — I27.20 PULMONARY HYPERTENSION (HCC): ICD-10-CM

## 2021-04-12 DIAGNOSIS — I10 ESSENTIAL HYPERTENSION: ICD-10-CM

## 2021-04-12 LAB
AORTIC DIMENSIONLESS INDEX: 0.9 (DI)
BH CV ECHO MEAS - ACS: 2.1 CM
BH CV ECHO MEAS - AO MAX PG (FULL): 0.56 MMHG
BH CV ECHO MEAS - AO MAX PG: 3.5 MMHG
BH CV ECHO MEAS - AO MEAN PG (FULL): 1 MMHG
BH CV ECHO MEAS - AO MEAN PG: 2 MMHG
BH CV ECHO MEAS - AO ROOT AREA (BSA CORRECTED): 1.8
BH CV ECHO MEAS - AO ROOT AREA: 10.8 CM^2
BH CV ECHO MEAS - AO ROOT DIAM: 3.7 CM
BH CV ECHO MEAS - AO V2 MAX: 93.3 CM/SEC
BH CV ECHO MEAS - AO V2 MEAN: 60 CM/SEC
BH CV ECHO MEAS - AO V2 VTI: 19.8 CM
BH CV ECHO MEAS - BSA(HAYCOCK): 2.1 M^2
BH CV ECHO MEAS - BSA: 2 M^2
BH CV ECHO MEAS - BZI_BMI: 29.5 KILOGRAMS/M^2
BH CV ECHO MEAS - BZI_METRIC_HEIGHT: 172.7 CM
BH CV ECHO MEAS - BZI_METRIC_WEIGHT: 88 KG
BH CV ECHO MEAS - EDV(CUBED): 92.3 ML
BH CV ECHO MEAS - EDV(TEICH): 93.4 ML
BH CV ECHO MEAS - EF(CUBED): 62.8 %
BH CV ECHO MEAS - EF(TEICH): 54.5 %
BH CV ECHO MEAS - EF_3D-VOL: 59 %
BH CV ECHO MEAS - ESV(CUBED): 34.3 ML
BH CV ECHO MEAS - ESV(TEICH): 42.5 ML
BH CV ECHO MEAS - FS: 28.1 %
BH CV ECHO MEAS - IVS/LVPW: 0.88
BH CV ECHO MEAS - IVSD: 1.1 CM
BH CV ECHO MEAS - LA 3D VOL INDEX: 40
BH CV ECHO MEAS - LA DIMENSION(2D): 4.7 CM
BH CV ECHO MEAS - LA DIMENSION: 4.9 CM
BH CV ECHO MEAS - LA/AO: 1.3
BH CV ECHO MEAS - LAT PEAK E' VEL: 6.5 CM/SEC
BH CV ECHO MEAS - LV IVRT: 0.14 SEC
BH CV ECHO MEAS - LV MASS(C)D: 200.7 GRAMS
BH CV ECHO MEAS - LV MASS(C)DI: 99.5 GRAMS/M^2
BH CV ECHO MEAS - LV MAX PG: 2.9 MMHG
BH CV ECHO MEAS - LV MEAN PG: 1 MMHG
BH CV ECHO MEAS - LV V1 MAX: 85.5 CM/SEC
BH CV ECHO MEAS - LV V1 MEAN: 47.4 CM/SEC
BH CV ECHO MEAS - LV V1 VTI: 18.3 CM
BH CV ECHO MEAS - LVIDD: 4.5 CM
BH CV ECHO MEAS - LVIDS: 3.3 CM
BH CV ECHO MEAS - LVPWD: 1.3 CM
BH CV ECHO MEAS - MED PEAK E' VEL: 6.5 CM/SEC
BH CV ECHO MEAS - MR MAX PG: 48.4 MMHG
BH CV ECHO MEAS - MR MAX VEL: 348 CM/SEC
BH CV ECHO MEAS - MV A MAX VEL: 84.5 CM/SEC
BH CV ECHO MEAS - MV DEC SLOPE: 327 CM/SEC^2
BH CV ECHO MEAS - MV DEC TIME: 0.32 SEC
BH CV ECHO MEAS - MV E MAX VEL: 69.2 CM/SEC
BH CV ECHO MEAS - MV E/A: 0.82
BH CV ECHO MEAS - MV MAX PG: 3.7 MMHG
BH CV ECHO MEAS - MV MEAN PG: 2 MMHG
BH CV ECHO MEAS - MV P1/2T MAX VEL: 89.8 CM/SEC
BH CV ECHO MEAS - MV P1/2T: 80.4 MSEC
BH CV ECHO MEAS - MV V2 MAX: 96.1 CM/SEC
BH CV ECHO MEAS - MV V2 MEAN: 59.8 CM/SEC
BH CV ECHO MEAS - MV V2 VTI: 37.7 CM
BH CV ECHO MEAS - MVA P1/2T LCG: 2.4 CM^2
BH CV ECHO MEAS - MVA(P1/2T): 2.7 CM^2
BH CV ECHO MEAS - PA MAX PG (FULL): 2.5 MMHG
BH CV ECHO MEAS - PA MAX PG: 3.8 MMHG
BH CV ECHO MEAS - PA MEAN PG (FULL): 1 MMHG
BH CV ECHO MEAS - PA MEAN PG: 2 MMHG
BH CV ECHO MEAS - PA V2 MAX: 97.6 CM/SEC
BH CV ECHO MEAS - PA V2 MEAN: 60.3 CM/SEC
BH CV ECHO MEAS - PA V2 VTI: 17.3 CM
BH CV ECHO MEAS - RAP SYSTOLE: 10 MMHG
BH CV ECHO MEAS - RV MAX PG: 1.4 MMHG
BH CV ECHO MEAS - RV MEAN PG: 1 MMHG
BH CV ECHO MEAS - RV V1 MAX: 58.1 CM/SEC
BH CV ECHO MEAS - RV V1 MEAN: 35 CM/SEC
BH CV ECHO MEAS - RV V1 VTI: 10.8 CM
BH CV ECHO MEAS - RVDD: 2.8 CM
BH CV ECHO MEAS - RVSP: 50 MMHG
BH CV ECHO MEAS - SI(AO): 105.5 ML/M^2
BH CV ECHO MEAS - SI(CUBED): 28.8 ML/M^2
BH CV ECHO MEAS - SI(TEICH): 25.2 ML/M^2
BH CV ECHO MEAS - SV(AO): 212.9 ML
BH CV ECHO MEAS - SV(CUBED): 58 ML
BH CV ECHO MEAS - SV(TEICH): 50.9 ML
BH CV ECHO MEAS - TR MAX VEL: 317 CM/SEC
BH CV ECHO MEASUREMENTS AVERAGE E/E' RATIO: 10.65
MAXIMAL PREDICTED HEART RATE: 141 BPM
STRESS TARGET HR: 120 BPM

## 2021-04-12 PROCEDURE — 93306 TTE W/DOPPLER COMPLETE: CPT

## 2021-04-12 PROCEDURE — 93306 TTE W/DOPPLER COMPLETE: CPT | Performed by: INTERNAL MEDICINE

## 2021-07-14 ENCOUNTER — OUTSIDE FACILITY SERVICE (OUTPATIENT)
Dept: CARDIOLOGY | Facility: CLINIC | Age: 81
End: 2021-07-14

## 2021-07-14 PROCEDURE — 93225 XTRNL ECG REC<48 HRS REC: CPT | Performed by: INTERNAL MEDICINE

## 2021-07-14 PROCEDURE — 93308 TTE F-UP OR LMTD: CPT | Performed by: INTERNAL MEDICINE

## 2021-07-14 PROCEDURE — 93321 DOPPLER ECHO F-UP/LMTD STD: CPT | Performed by: INTERNAL MEDICINE

## 2021-10-14 ENCOUNTER — OFFICE VISIT (OUTPATIENT)
Dept: CARDIOLOGY | Facility: CLINIC | Age: 81
End: 2021-10-14

## 2021-10-14 ENCOUNTER — TELEPHONE (OUTPATIENT)
Dept: CARDIOLOGY | Facility: CLINIC | Age: 81
End: 2021-10-14

## 2021-10-14 VITALS
HEIGHT: 68 IN | WEIGHT: 189 LBS | TEMPERATURE: 95.7 F | BODY MASS INDEX: 28.64 KG/M2 | SYSTOLIC BLOOD PRESSURE: 104 MMHG | DIASTOLIC BLOOD PRESSURE: 70 MMHG | HEART RATE: 72 BPM

## 2021-10-14 DIAGNOSIS — I10 PRIMARY HYPERTENSION: ICD-10-CM

## 2021-10-14 DIAGNOSIS — E78.00 PURE HYPERCHOLESTEROLEMIA: ICD-10-CM

## 2021-10-14 DIAGNOSIS — R06.02 SOB (SHORTNESS OF BREATH): ICD-10-CM

## 2021-10-14 DIAGNOSIS — R60.0 BILATERAL LEG EDEMA: ICD-10-CM

## 2021-10-14 DIAGNOSIS — I25.9 IHD (ISCHEMIC HEART DISEASE): ICD-10-CM

## 2021-10-14 DIAGNOSIS — I10 ESSENTIAL HYPERTENSION: ICD-10-CM

## 2021-10-14 DIAGNOSIS — J84.10 PULMONARY FIBROSIS (HCC): ICD-10-CM

## 2021-10-14 DIAGNOSIS — I25.10 CORONARY ARTERY DISEASE INVOLVING NATIVE CORONARY ARTERY OF NATIVE HEART WITHOUT ANGINA PECTORIS: Primary | ICD-10-CM

## 2021-10-14 DIAGNOSIS — I27.20 PULMONARY HYPERTENSION (HCC): ICD-10-CM

## 2021-10-14 DIAGNOSIS — R55 SYNCOPE AND COLLAPSE: ICD-10-CM

## 2021-10-14 PROCEDURE — 99214 OFFICE O/P EST MOD 30 MIN: CPT | Performed by: NURSE PRACTITIONER

## 2021-10-14 RX ORDER — ISOSORBIDE MONONITRATE 30 MG/1
30 TABLET, EXTENDED RELEASE ORAL DAILY
Qty: 90 TABLET | Refills: 3 | Status: SHIPPED | OUTPATIENT
Start: 2021-10-14 | End: 2021-12-07 | Stop reason: SDUPTHER

## 2021-10-14 RX ORDER — ATORVASTATIN CALCIUM 10 MG/1
10 TABLET, FILM COATED ORAL DAILY
Qty: 90 TABLET | Refills: 3 | Status: SHIPPED | OUTPATIENT
Start: 2021-10-14 | End: 2021-12-07 | Stop reason: SDUPTHER

## 2021-10-14 RX ORDER — POTASSIUM CHLORIDE 750 MG/1
10 TABLET, EXTENDED RELEASE ORAL DAILY
Qty: 90 TABLET | Refills: 3 | Status: SHIPPED | OUTPATIENT
Start: 2021-10-14 | End: 2021-12-07 | Stop reason: SDUPTHER

## 2021-10-14 RX ORDER — CLOPIDOGREL BISULFATE 75 MG/1
75 TABLET ORAL DAILY
Qty: 90 TABLET | Refills: 3 | Status: SHIPPED | OUTPATIENT
Start: 2021-10-14 | End: 2021-12-07 | Stop reason: SDUPTHER

## 2021-10-14 RX ORDER — LOSARTAN POTASSIUM 25 MG/1
25 TABLET ORAL DAILY
Qty: 90 TABLET | Refills: 3 | Status: SHIPPED | OUTPATIENT
Start: 2021-10-14 | End: 2021-12-07 | Stop reason: SDUPTHER

## 2021-10-14 RX ORDER — CARVEDILOL 6.25 MG/1
6.25 TABLET ORAL 2 TIMES DAILY WITH MEALS
Qty: 180 TABLET | Refills: 3 | Status: SHIPPED | OUTPATIENT
Start: 2021-10-14 | End: 2021-12-07 | Stop reason: SDUPTHER

## 2021-10-14 RX ORDER — CELECOXIB 100 MG/1
100 CAPSULE ORAL 2 TIMES DAILY
COMMUNITY
End: 2021-10-14 | Stop reason: ALTCHOICE

## 2021-10-14 RX ORDER — FUROSEMIDE 20 MG/1
20 TABLET ORAL 2 TIMES DAILY
Qty: 180 TABLET | Refills: 3 | Status: SHIPPED | OUTPATIENT
Start: 2021-10-14 | End: 2021-12-07 | Stop reason: SDUPTHER

## 2021-10-14 NOTE — TELEPHONE ENCOUNTER
Labs, CT of chest, chest xray and discharge summary obtained and placed on your desk. Did not find that he had carotid US.

## 2021-10-14 NOTE — PROGRESS NOTES
Chief Complaint   Patient presents with   • Follow-up     Cardiac management   • Lab     Last labs 3 months ago per PCP. Also had labs at [facility] about 3 months ago after a fall.   • Shortness of Breath     He relates to pulmonary fibrosis.   • Edema     Has some swelling in ankles.   • Med Refill     Needs refills on cardiac medications-90 day.     Subjective       Mandeep William is a 80 y.o. male with pulmonary fibrosis, HTN and Marsh's esophagus who was referred in November 2016 for increasing dyspnea and cough. Stress showed anterior wall infarct with christa-infarct ischemia. Imdur started, cardiac cath advised showing 80-90% LCX, received 2 stents. The LAD was found to be chronically occluded. No intervention was able to be done as it could not be reached, medical management advised. He was referred to Dr. Philip for possible cath to open up LAD. Dr. Philip ordered cardiac MR on 11/25/17 which showed most likely nonviable myocardium in the LAD territory. LVEF noted as 61%. It was decided to not proceed with cath. Repeat echo in May 2019 showed LVEF remained normal and PA pressure slightly increased, 41 mmHg. He follows with Dr. Powers for pulmonary management. Does not use O2.     He returns today for follow up. He had a syncopal episode on 7/13/21 after getting gout of bed to go to the bathroom. Hit his head, required stiches. At [facility], orthostatic bps were normal, 6 minute walk test- sats dropped to 70s. Referred back to Dr. Powers for fu. CT head showed subdural hematoma with 24 hour fu CT stable. Plavix/asp held x 7 days. CTA chest showed no PE, advanced interstitial fibrosis. No carotid work up found in reports. Labs showed elevated WBC (26), abnml urine culture, elevated D-dimer, GFR 58, low albumin, normal protein, plts 121, H/H 11.5/36. Negative troponin. He denies CP, he is chronically SOB. Mild increasing LE edema.          Cardiac History:    Past Surgical History:   Procedure Laterality Date   • CARDIAC  CATHETERIZATION  12/19/2016    100% LAD. 80% OM2   • CARDIAC CATHETERIZATION  12/21/2016    Dr. Pollard- 3.0x18 & 3.0x9 Resolute stents in LCX. Unable to cross  of LAD   • CARDIOVASCULAR STRESS TEST  12/01/2016    L. Myoview- Anterior Infarct with Periinfarct Ischemia.   • CARDIOVASCULAR STRESS TEST  09/26/2017    L. Myoview- Anterior Infarct with PeriInfarct ischemia.. EF > 65%   • COLONOSCOPY     • ECHO - CONVERTED  12/01/2016    EF 60%   • ECHO - CONVERTED  05/06/2019    EF 60%. Mild MR. RVSP- 41 mmHg. Small Pericardial Effusion   • ECHO - CONVERTED  04/12/2021    EF 6-%. LA- 5.0 Cm. Mild MR. AO- 3.7 Cm. RVSP- 50 mmHg   • ECHO - CONVERTED  07/14/2021    ([facility]) EF 60%, RVSP 49 mmHg, no shunt    • OTHER SURGICAL HISTORY  11/15/2017    cardiac MR- EF 61%, nonviable LAD territory   • OTHER SURGICAL HISTORY  07/13/2021    CTA chest- advanced pulmonary fibrosis      Current Outpatient Medications   Medication Sig Dispense Refill   • albuterol (PROVENTIL) (2.5 MG/3ML) 0.083% nebulizer solution Take 2.5 mg by nebulization Every 4 (Four) Hours As Needed for wheezing.     • aspirin 81 MG EC tablet Take 81 mg by mouth Daily.     • atorvastatin (LIPITOR) 10 MG tablet Take 1 tablet by mouth Daily. 90 tablet 3   • carvedilol (COREG) 6.25 MG tablet Take 1 tablet by mouth 2 (Two) Times a Day With Meals. 180 tablet 3   • clopidogrel (PLAVIX) 75 MG tablet Take 1 tablet by mouth Daily. 90 tablet 3   • furosemide (LASIX) 20 MG tablet Take 1 tablet by mouth 2 (Two) Times a Day. 180 tablet 3   • isosorbide mononitrate (IMDUR) 30 MG 24 hr tablet Take 1 tablet by mouth Daily. 90 tablet 3   • losartan (COZAAR) 25 MG tablet Take 1 tablet by mouth Daily. 90 tablet 3   • pantoprazole (PROTONIX) 40 MG EC tablet Take 40 mg by mouth Daily.     • potassium chloride (K-DUR,KLOR-CON) 10 MEQ CR tablet Take 1 tablet by mouth Daily. 90 tablet 3   • tamsulosin (FLOMAX) 0.4 MG capsule 24 hr capsule Take 1 capsule by mouth Every Night.       No  "current facility-administered medications for this visit.     Patient has no known allergies.    Past Medical History:   Diagnosis Date   • Marsh's esophagus    • Basal cell carcinoma of skin     surgical removal   • Diaphragmatic hernia    • Diverticulitis    • GERD (gastroesophageal reflux disease)    • Hypertension    • Kidney stones    • Prostatitis    • Pulmonary fibrosis (HCC)      Social History     Socioeconomic History   • Marital status:    Tobacco Use   • Smoking status: Never Smoker   • Smokeless tobacco: Never Used   Vaping Use   • Vaping Use: Never used   Substance and Sexual Activity   • Alcohol use: No   • Drug use: No   • Sexual activity: Defer       Family History   Problem Relation Age of Onset   • Stroke Mother        Review of Systems   Constitutional: Positive for weight loss (down 5 lb). Negative for decreased appetite and malaise/fatigue.   HENT: Negative.    Eyes: Negative for blurred vision.   Cardiovascular: Negative for chest pain, dyspnea on exertion, leg swelling, palpitations and syncope.   Respiratory: Negative for shortness of breath and sleep disturbances due to breathing.    Endocrine: Negative.    Hematologic/Lymphatic: Negative for bleeding problem. Does not bruise/bleed easily.   Skin: Negative.    Musculoskeletal: Positive for falls. Negative for myalgias.   Gastrointestinal: Negative for abdominal pain, heartburn and melena.   Genitourinary: Negative for hematuria.   Neurological: Negative for dizziness and light-headedness.   Psychiatric/Behavioral: Negative for altered mental status.   Allergic/Immunologic: Negative.       Objective     /70 (BP Location: Left arm)   Pulse 72   Temp 95.7 °F (35.4 °C)   Ht 172 cm (67.72\")   Wt 85.7 kg (189 lb)   BMI 28.98 kg/m²     Vitals and nursing note reviewed.   Constitutional:       General: Not in acute distress.     Appearance: Well-developed. Not diaphoretic.   Eyes:      Pupils: Pupils are equal, round, and " reactive to light.   HENT:      Head: Normocephalic.   Pulmonary:      Effort: Pulmonary effort is normal. No respiratory distress.      Breath sounds: Normal breath sounds.   Cardiovascular:      Normal rate. Regular rhythm.      Murmurs: There is a systolic murmur.   Pulses:     Intact distal pulses.   Edema:     Peripheral edema present.     Pretibial: bilateral 1+ edema of the pretibial area.     Ankle: bilateral 1+ edema of the ankle.  Abdominal:      General: Bowel sounds are normal.      Palpations: Abdomen is soft.   Musculoskeletal: Normal range of motion.      Cervical back: Normal range of motion. Skin:     General: Skin is warm and dry.   Neurological:      Mental Status: Alert and oriented to person, place, and time.        Procedures          Problem List Items Addressed This Visit        Cardiac and Vasculature    HTN (hypertension)    Relevant Medications    carvedilol (COREG) 6.25 MG tablet    losartan (COZAAR) 25 MG tablet    furosemide (LASIX) 20 MG tablet    potassium chloride (K-DUR,KLOR-CON) 10 MEQ CR tablet    Hyperlipemia    Relevant Medications    atorvastatin (LIPITOR) 10 MG tablet    Coronary artery disease involving native coronary artery of native heart - Primary    Overview     Added automatically from request for surgery [id number]         Relevant Medications    carvedilol (COREG) 6.25 MG tablet    isosorbide mononitrate (IMDUR) 30 MG 24 hr tablet    clopidogrel (PLAVIX) 75 MG tablet       Pulmonary and Pneumonias    Pulmonary fibrosis (HCC)    Pulmonary hypertension (HCC)      Other Visit Diagnoses     Essential hypertension        Relevant Medications    carvedilol (COREG) 6.25 MG tablet    losartan (COZAAR) 25 MG tablet    furosemide (LASIX) 20 MG tablet    potassium chloride (K-DUR,KLOR-CON) 10 MEQ CR tablet    IHD (ischemic heart disease)        Relevant Medications    carvedilol (COREG) 6.25 MG tablet    isosorbide mononitrate (IMDUR) 30 MG 24 hr tablet    clopidogrel (PLAVIX) 75 MG  tablet         1.  CAD-stable, no angina. S/p stenting x 2 LCX, 2016. Chronically occluded LAD. Continue aspirin, Plavix, statin, BB, long-acting nitrate.      2. HTN- BP lower normal range. Will try reducing Lasix to 20 mg once daily to prevent dehydration, orthostatic changes.      3.  Hyperlipidemia-LDL/HDL 40/39.  Lipids remain very well controlled with Lipitor.  Continue the same.       4.  Pulmonary fibrosis/PHTN-echo remained stable. CTA chest reviewed. He has significant crackling and wheezing today. Will check BNP to make sure no HF compnent before we reduce Lasix too aggressively. Continue to follow with Dr. Powers.  He does not wear O2 or CPAP.      5. Fall- [facility] records reviewed, no recurrent syncope or fall. Will check carotid US to rule out stenosis. Adequate fluid intake. Change positions slowly.     Patient's Body mass index is 28.98 kg/m². indicating that he is overweight (BMI 25-29.9). BMI improving.              Electronically signed by BRAULIO Henderson,  October 15, 2021 13:34 EDT

## 2021-10-14 NOTE — TELEPHONE ENCOUNTER
Korin,    Can we get hospital records from visit in July.  He passed out, hit his head, had an echo.  I have the echo but need labs, chest x-ray, did they do a carotid ultrasound?    Thanks

## 2021-10-15 ENCOUNTER — TELEPHONE (OUTPATIENT)
Dept: CARDIOLOGY | Facility: CLINIC | Age: 81
End: 2021-10-15

## 2021-10-15 NOTE — TELEPHONE ENCOUNTER
Daughter made aware of need for carotid US and BNP, Minoo verbalized understanding and needs appointment late evening at 3:30pm.

## 2021-10-15 NOTE — TELEPHONE ENCOUNTER
Can we let his daughter know that I have put in order for carotid US and BNP. After reviewing all hosp records, recommend these tests.     Can call on Monday next week.

## 2021-10-20 ENCOUNTER — TELEPHONE (OUTPATIENT)
Dept: CARDIOLOGY | Facility: CLINIC | Age: 81
End: 2021-10-20

## 2021-10-20 NOTE — TELEPHONE ENCOUNTER
Daughter Anjali made aware of appointment for carotid US on 10/27/21 at 2:30pm, ODC aware it will take her until 3:30pm due to transportation.

## 2021-10-27 ENCOUNTER — HOSPITAL ENCOUNTER (OUTPATIENT)
Dept: CARDIOLOGY | Facility: HOSPITAL | Age: 81
Discharge: HOME | End: 2021-10-27
Admitting: NURSE PRACTITIONER

## 2021-10-27 DIAGNOSIS — R55 SYNCOPE AND COLLAPSE: ICD-10-CM

## 2021-10-27 PROCEDURE — 93880 EXTRACRANIAL BILAT STUDY: CPT | Performed by: RADIOLOGY

## 2021-10-27 PROCEDURE — 93880 EXTRACRANIAL BILAT STUDY: CPT

## 2021-12-07 DIAGNOSIS — I25.9 IHD (ISCHEMIC HEART DISEASE): ICD-10-CM

## 2021-12-07 DIAGNOSIS — E78.00 PURE HYPERCHOLESTEROLEMIA: ICD-10-CM

## 2021-12-07 DIAGNOSIS — I10 ESSENTIAL HYPERTENSION: ICD-10-CM

## 2021-12-07 RX ORDER — CLOPIDOGREL BISULFATE 75 MG/1
75 TABLET ORAL DAILY
Qty: 90 TABLET | Refills: 3 | Status: SHIPPED | OUTPATIENT
Start: 2021-12-07 | End: 2022-12-01 | Stop reason: SDUPTHER

## 2021-12-07 RX ORDER — CARVEDILOL 6.25 MG/1
6.25 TABLET ORAL 2 TIMES DAILY WITH MEALS
Qty: 180 TABLET | Refills: 3 | Status: SHIPPED | OUTPATIENT
Start: 2021-12-07 | End: 2022-12-01

## 2021-12-07 RX ORDER — ISOSORBIDE MONONITRATE 30 MG/1
30 TABLET, EXTENDED RELEASE ORAL DAILY
Qty: 90 TABLET | Refills: 3 | Status: SHIPPED | OUTPATIENT
Start: 2021-12-07 | End: 2022-09-07

## 2021-12-07 RX ORDER — LOSARTAN POTASSIUM 25 MG/1
25 TABLET ORAL DAILY
Qty: 90 TABLET | Refills: 3 | Status: SHIPPED | OUTPATIENT
Start: 2021-12-07 | End: 2022-09-07

## 2021-12-07 RX ORDER — POTASSIUM CHLORIDE 750 MG/1
10 TABLET, EXTENDED RELEASE ORAL DAILY
Qty: 90 TABLET | Refills: 3 | Status: SHIPPED | OUTPATIENT
Start: 2021-12-07 | End: 2022-06-20

## 2021-12-07 RX ORDER — ATORVASTATIN CALCIUM 10 MG/1
10 TABLET, FILM COATED ORAL DAILY
Qty: 90 TABLET | Refills: 3 | Status: SHIPPED | OUTPATIENT
Start: 2021-12-07 | End: 2022-12-01 | Stop reason: SDUPTHER

## 2021-12-07 RX ORDER — FUROSEMIDE 20 MG/1
20 TABLET ORAL 2 TIMES DAILY
Qty: 180 TABLET | Refills: 3 | Status: SHIPPED | OUTPATIENT
Start: 2021-12-07 | End: 2022-12-01

## 2021-12-07 NOTE — TELEPHONE ENCOUNTER
Patient's daughter, Anjali, called stating that patient has had to switch pharmacies due to insurance changing and pharmacy is now [facility] in [place].     [facility]

## 2022-05-11 ENCOUNTER — OFFICE VISIT (OUTPATIENT)
Dept: CARDIOLOGY | Facility: CLINIC | Age: 82
End: 2022-05-11

## 2022-05-11 DIAGNOSIS — I25.10 CORONARY ARTERY DISEASE INVOLVING NATIVE CORONARY ARTERY OF NATIVE HEART WITHOUT ANGINA PECTORIS: ICD-10-CM

## 2022-05-11 DIAGNOSIS — R06.02 SHORTNESS OF BREATH: ICD-10-CM

## 2022-05-11 DIAGNOSIS — J84.10 PULMONARY FIBROSIS: ICD-10-CM

## 2022-05-11 DIAGNOSIS — I10 PRIMARY HYPERTENSION: ICD-10-CM

## 2022-05-11 DIAGNOSIS — E78.00 PURE HYPERCHOLESTEROLEMIA: ICD-10-CM

## 2022-05-11 DIAGNOSIS — R60.0 BILATERAL LEG EDEMA: Primary | ICD-10-CM

## 2022-05-11 DIAGNOSIS — I27.20 PULMONARY HYPERTENSION: ICD-10-CM

## 2022-05-11 PROCEDURE — 99214 OFFICE O/P EST MOD 30 MIN: CPT | Performed by: NURSE PRACTITIONER

## 2022-05-11 NOTE — PROGRESS NOTES
Chief Complaint   Patient presents with   • Follow-up     Cardiac management-Patient denies any chest pain, SOA, palpitations or dizziness.    • Edema     Bilateral feet and ankles. He takes Lasix 20mg BID. It is not helping the swelling at all. He states he has been on Lasix for a very long time.   • Med Refill     Verbally verified all current medications. He does not need refills at this time.     Subjective       Mandeep William is a 81 y.o. male with pulmonary fibrosis, HTN and Marsh's esophagus who was referred in November 2016 for increasing dyspnea and cough. Cardiac cath revealed 80-90% LCX, received 2 stents. The LAD was found to be chronically occluded. No intervention was able to be done as it could not be reached, medical management advised. He was referred to Dr. Philip for possible cath to open up LAD. Dr. Philip ordered cardiac MR on 11/25/17 which showed most likely nonviable myocardium in the LAD territory. It was decided to not proceed with cath. Echo 4/2021 stable. Follows with Dr. Powers for pulmonary management. Does not use O2. He had syncopal episode on 7/13/21 after getting out of bed to use the bathroom. Hit his head, required stiches. At [facility], orthostatic bps were normal, 6 minute walk test- sats dropped to 70s. Referred back to pulmonology. CTA chest showed advanced interstitial fibrosis.     He returns today for follow up with his daughter. He denies CP, he is chronically SOB. Persistent LE edema. He was prescribed home O2 but did not use and returned to company. No further syncope. Plans to have labs with Dr. Kimball.          Cardiac History:    Past Surgical History:   Procedure Laterality Date   • CARDIAC CATHETERIZATION  12/19/2016    100% LAD. 80% OM2   • CARDIAC CATHETERIZATION  12/21/2016    Dr. Pollard- 3.0x18 & 3.0x9 Resolute stents in LCX. Unable to cross  of LAD   • CARDIOVASCULAR STRESS TEST  12/01/2016    L. Myoview- Anterior Infarct with Periinfarct Ischemia.   •  CARDIOVASCULAR STRESS TEST  09/26/2017    L. Myoview- Anterior Infarct with PeriInfarct ischemia.. EF > 65%   • COLONOSCOPY     • ECHO - CONVERTED  12/01/2016    EF 60%   • ECHO - CONVERTED  05/06/2019    EF 60%. Mild MR. RVSP- 41 mmHg. Small Pericardial Effusion   • ECHO - CONVERTED  04/12/2021    EF 6-%. LA- 5.0 Cm. Mild MR. AO- 3.7 Cm. RVSP- 50 mmHg   • ECHO - CONVERTED  07/14/2021    ([facility]) EF 60%, RVSP 49 mmHg, no shunt    • OTHER SURGICAL HISTORY  11/15/2017    cardiac MR- EF 61%, nonviable LAD territory   • OTHER SURGICAL HISTORY  07/13/2021    CTA chest- advanced pulmonary fibrosis      Current Outpatient Medications   Medication Sig Dispense Refill   • albuterol (PROVENTIL) (2.5 MG/3ML) 0.083% nebulizer solution Take 2.5 mg by nebulization Every 4 (Four) Hours As Needed for wheezing.     • aspirin 81 MG EC tablet Take 81 mg by mouth Daily.     • atorvastatin (LIPITOR) 10 MG tablet Take 1 tablet by mouth Daily. 90 tablet 3   • carvedilol (COREG) 6.25 MG tablet Take 1 tablet by mouth 2 (Two) Times a Day With Meals. 180 tablet 3   • clopidogrel (PLAVIX) 75 MG tablet Take 1 tablet by mouth Daily. 90 tablet 3   • furosemide (LASIX) 20 MG tablet Take 1 tablet by mouth 2 (Two) Times a Day. 180 tablet 3   • isosorbide mononitrate (IMDUR) 30 MG 24 hr tablet Take 1 tablet by mouth Daily. 90 tablet 3   • losartan (COZAAR) 25 MG tablet Take 1 tablet by mouth Daily. 90 tablet 3   • pantoprazole (PROTONIX) 40 MG EC tablet Take 40 mg by mouth Daily.     • potassium chloride (K-DUR,KLOR-CON) 10 MEQ CR tablet Take 1 tablet by mouth Daily. 90 tablet 3   • tamsulosin (FLOMAX) 0.4 MG capsule 24 hr capsule Take 1 capsule by mouth Every Night.       No current facility-administered medications for this visit.     Patient has no known allergies.    Past Medical History:   Diagnosis Date   • Marsh's esophagus    • Basal cell carcinoma of skin     surgical removal   • Diaphragmatic hernia    • Diverticulitis    • GERD  (gastroesophageal reflux disease)    • Hypertension    • Kidney stones    • Prostatitis    • Pulmonary fibrosis (HCC)      Social History     Socioeconomic History   • Marital status:    Tobacco Use   • Smoking status: Never Smoker   • Smokeless tobacco: Never Used   Vaping Use   • Vaping Use: Never used   Substance and Sexual Activity   • Alcohol use: No   • Drug use: No   • Sexual activity: Defer     Family History   Problem Relation Age of Onset   • Stroke Mother      Review of Systems   Constitutional: Negative for decreased appetite and malaise/fatigue.   HENT: Negative.    Eyes: Negative for blurred vision.   Cardiovascular: Positive for dyspnea on exertion and leg swelling. Negative for chest pain, palpitations and syncope.   Respiratory: Positive for shortness of breath. Negative for sleep disturbances due to breathing.    Endocrine: Negative.    Hematologic/Lymphatic: Negative for bleeding problem. Does not bruise/bleed easily.   Skin: Negative.    Musculoskeletal: Negative for falls and myalgias.   Gastrointestinal: Negative for abdominal pain, heartburn and melena.   Genitourinary: Negative for hematuria.   Neurological: Negative for dizziness and light-headedness.   Psychiatric/Behavioral: Negative for altered mental status.   Allergic/Immunologic: Negative.       Objective     /60 (BP Location: Right arm)   Pulse 70     Vitals and nursing note reviewed.   Constitutional:       General: Not in acute distress.     Appearance: Well-developed. Not diaphoretic.   Eyes:      Pupils: Pupils are equal, round, and reactive to light.   HENT:      Head: Normocephalic.   Pulmonary:      Effort: Pulmonary effort is normal. No respiratory distress.      Breath sounds: Normal breath sounds.   Cardiovascular:      Normal rate. Regular rhythm.      Murmurs: There is a systolic murmur.   Pulses:     Intact distal pulses.   Edema:     Peripheral edema present.  Abdominal:      General: Bowel sounds are  normal.      Palpations: Abdomen is soft.   Musculoskeletal: Normal range of motion.      Cervical back: Normal range of motion. Skin:     General: Skin is warm and dry.   Neurological:      Mental Status: Alert and oriented to person, place, and time.        Procedures          Problem List Items Addressed This Visit        Cardiac and Vasculature    CAD (coronary artery disease)    HTN (hypertension)    Hyperlipemia       Pulmonary and Pneumonias    Pulmonary fibrosis (HCC)    Pulmonary hypertension (HCC)      Other Visit Diagnoses     Bilateral leg edema    -  Primary    Relevant Orders    proBNP    Shortness of breath        Relevant Orders    proBNP         1.  CAD-stable, no angina. S/p stenting x 2 LCX, 2016. Chronically occluded LAD. Continue aspirin, Plavix, statin, BB, long-acting nitrate.      2. HTN- BP stable. Continue same plan.       3.  Hyperlipidemia-LDL/HDL 40/39.  Lipids remain very well controlled with Lipitor.  Continue the same.        4.  Pulmonary fibrosis/PHTN-echo remained stable. CTA chest with significant interstitial fibrosis. Lungs more clear today. Check BNP to r/o HF. Continue Lasix. Continue to follow with Dr. Powers.  He does not wear O2 or CPAP.       5. Leg swelling- CVI, elevation, compression. He was given a lab order to check BNP when he presents to Dr. Kimball's office for labs.      BMI has not been calculated during today's encounter.                Electronically signed by BRAULIO Henderson,  May 14, 2022 13:38 EDT

## 2022-05-14 VITALS — SYSTOLIC BLOOD PRESSURE: 120 MMHG | HEART RATE: 70 BPM | DIASTOLIC BLOOD PRESSURE: 60 MMHG

## 2022-06-17 DIAGNOSIS — I10 ESSENTIAL HYPERTENSION: ICD-10-CM

## 2022-06-20 RX ORDER — POTASSIUM CHLORIDE 750 MG/1
TABLET, EXTENDED RELEASE ORAL
Qty: 90 TABLET | Refills: 3 | Status: SHIPPED | OUTPATIENT
Start: 2022-06-20

## 2022-07-27 ENCOUNTER — TELEPHONE (OUTPATIENT)
Dept: CARDIOLOGY | Facility: CLINIC | Age: 82
End: 2022-07-27

## 2022-07-27 NOTE — TELEPHONE ENCOUNTER
Daughter Anjali called concerning swelling in feet and legs, swelling is no better since increasing lasix to 20 mg bid. [facility] going to attempt to obtain records tomorrow and bring to office.

## 2022-07-28 NOTE — TELEPHONE ENCOUNTER
BNP is normal.     The leg swelling is not from heart. That is why the lasix is not helping.     It is probable venous insufficiency or lymphedema.     Unfortunately, there isn't much that can help. Compression socks and elevating legs. Also, increasing protein intake may help.

## 2022-07-29 NOTE — TELEPHONE ENCOUNTER
Daughter Anjali made aware of BNP results and recommendations for compression socks, elevating legs and increasing protein intake may help. Anjali verbalized understanding.

## 2022-09-07 DIAGNOSIS — I10 ESSENTIAL HYPERTENSION: ICD-10-CM

## 2022-09-07 DIAGNOSIS — I25.9 IHD (ISCHEMIC HEART DISEASE): ICD-10-CM

## 2022-09-07 RX ORDER — LOSARTAN POTASSIUM 25 MG/1
25 TABLET ORAL DAILY
Qty: 90 TABLET | Refills: 3 | Status: SHIPPED | OUTPATIENT
Start: 2022-09-07

## 2022-09-07 RX ORDER — ISOSORBIDE MONONITRATE 30 MG/1
30 TABLET, EXTENDED RELEASE ORAL DAILY
Qty: 90 TABLET | Refills: 3 | Status: SHIPPED | OUTPATIENT
Start: 2022-09-07

## 2022-12-01 ENCOUNTER — OFFICE VISIT (OUTPATIENT)
Dept: CARDIOLOGY | Facility: CLINIC | Age: 82
End: 2022-12-01

## 2022-12-01 VITALS — DIASTOLIC BLOOD PRESSURE: 60 MMHG | HEART RATE: 68 BPM | SYSTOLIC BLOOD PRESSURE: 90 MMHG

## 2022-12-01 DIAGNOSIS — I10 ESSENTIAL HYPERTENSION: ICD-10-CM

## 2022-12-01 DIAGNOSIS — E78.00 PURE HYPERCHOLESTEROLEMIA: ICD-10-CM

## 2022-12-01 DIAGNOSIS — I25.9 IHD (ISCHEMIC HEART DISEASE): ICD-10-CM

## 2022-12-01 PROCEDURE — 99214 OFFICE O/P EST MOD 30 MIN: CPT | Performed by: NURSE PRACTITIONER

## 2022-12-01 RX ORDER — CLOPIDOGREL BISULFATE 75 MG/1
75 TABLET ORAL DAILY
Qty: 90 TABLET | Refills: 3 | Status: SHIPPED | OUTPATIENT
Start: 2022-12-01

## 2022-12-01 RX ORDER — FUROSEMIDE 20 MG/1
20 TABLET ORAL DAILY PRN
Qty: 90 TABLET | Refills: 3 | Status: SHIPPED | OUTPATIENT
Start: 2022-12-01

## 2022-12-01 RX ORDER — CARVEDILOL 3.12 MG/1
3.12 TABLET ORAL DAILY
Qty: 90 TABLET | Refills: 3 | Status: SHIPPED | OUTPATIENT
Start: 2022-12-01

## 2022-12-01 RX ORDER — ATORVASTATIN CALCIUM 10 MG/1
10 TABLET, FILM COATED ORAL DAILY
Qty: 90 TABLET | Refills: 3 | Status: SHIPPED | OUTPATIENT
Start: 2022-12-01

## 2022-12-01 RX ORDER — MIRTAZAPINE 15 MG/1
15 TABLET, FILM COATED ORAL NIGHTLY
COMMUNITY

## 2022-12-01 RX ORDER — CARVEDILOL 3.12 MG/1
3.12 TABLET ORAL DAILY
COMMUNITY
End: 2022-12-01 | Stop reason: SDUPTHER

## 2022-12-01 NOTE — PROGRESS NOTES
Chief Complaint   Patient presents with   • Follow-up     Cardiac management   • Lab     Last labs at [facility] a month ago. To have labs per PCP this month.   • Shortness of Breath     Noticed last month, went to [facility]. Was started on steroids, also Dx with anemia.   • Med Refill     Needs refills on cardiac medications except Imdur and Losartan-90 day.     Subjective       Mandeep William is a 82 y.o. male with pulmonary fibrosis, HTN and Marsh's esophagus who was referred in November 2016 for increasing dyspnea and cough. Cardiac cath revealed 80-90% LCX, received 2 stents. The LAD was found to be chronically occluded. No intervention was able to be done as it could not be reached, medical management advised. He was referred to Dr. Philip for possible cath to open up LAD. Dr. Philip ordered cardiac MR on 11/25/17 which showed most likely nonviable myocardium in the LAD territory. It was decided to not proceed with cath. Echo 4/2021 stable. Follows with Dr. Powers for pulmonary management. Does not use O2. He had syncopal episode on 7/13/21 after getting out of bed to use the bathroom. Hit his head, required stiches. At [facility], orthostatic bps were normal, 6 minute walk test- sats dropped to 70s. Referred back to pulmonology. CTA chest showed advanced interstitial fibrosis. He was prescribed home O2 but did not use. BNP normal 8/1/22.     He returns today for follow up with his daughter. He was seen in [facility] on 11/11/22 with weakness, SOB. Dx with anemia, treated with steroids. CBC  showed H/H 10.4/33.5, plts 140, RDW 15.3, mag 1.7, TSH 6.7, K 3.3, glucose 108, Cr 1.22, GFR 60, albumin low at 2.4, protein 6.5, normal LFT. CXR- advanced fibrosis. He denies CP, he is weak, in a wheelchair. O2 sat 97%. Persistent LE edema. Appetite poor. His daughter has made arrangements to help with eating and ADL's. Home health came for short time.           Cardiac History:    Past Surgical History:   Procedure Laterality Date   •  CARDIAC CATHETERIZATION  12/19/2016    100% LAD. 80% OM2   • CARDIAC CATHETERIZATION  12/21/2016    Dr. Pollard- 3.0x18 & 3.0x9 Resolute stents in LCX. Unable to cross  of LAD   • CARDIOVASCULAR STRESS TEST  12/01/2016    L. Myoview- Anterior Infarct with Periinfarct Ischemia.   • CARDIOVASCULAR STRESS TEST  09/26/2017    L. Myoview- Anterior Infarct with PeriInfarct ischemia.. EF > 65%   • COLONOSCOPY     • ECHO - CONVERTED  12/01/2016    EF 60%   • ECHO - CONVERTED  05/06/2019    EF 60%. Mild MR. RVSP- 41 mmHg. Small Pericardial Effusion   • ECHO - CONVERTED  04/12/2021    EF 6-%. LA- 5.0 Cm. Mild MR. AO- 3.7 Cm. RVSP- 50 mmHg   • ECHO - CONVERTED  07/14/2021    ([facility]) EF 60%, RVSP 49 mmHg, no shunt    • OTHER SURGICAL HISTORY  11/15/2017    cardiac MR- EF 61%, nonviable LAD territory   • OTHER SURGICAL HISTORY  07/13/2021    CTA chest- advanced pulmonary fibrosis      Current Outpatient Medications   Medication Sig Dispense Refill   • aspirin 81 MG EC tablet Take 81 mg by mouth Daily.     • atorvastatin (LIPITOR) 10 MG tablet Take 1 tablet by mouth Daily. 90 tablet 3   • carvedilol (COREG) 3.125 MG tablet Take 1 tablet by mouth Daily. 90 tablet 3   • clopidogrel (PLAVIX) 75 MG tablet Take 1 tablet by mouth Daily. 90 tablet 3   • furosemide (LASIX) 20 MG tablet Take 1 tablet by mouth Daily As Needed (leg swelling). 90 tablet 3   • isosorbide mononitrate (IMDUR) 30 MG 24 hr tablet TAKE 1 TABLET BY MOUTH DAILY 90 tablet 3   • KLOR-CON 10 MEQ CR tablet TAKE 1 TABLET BY MOUTH DAILY 90 tablet 3   • losartan (COZAAR) 25 MG tablet TAKE 1 TABLET BY MOUTH DAILY 90 tablet 3   • mirtazapine (REMERON) 15 MG tablet Take 15 mg by mouth Every Night.     • pantoprazole (PROTONIX) 40 MG EC tablet Take 40 mg by mouth Daily.     • tamsulosin (FLOMAX) 0.4 MG capsule 24 hr capsule Take 1 capsule by mouth Every Night.       No current facility-administered medications for this visit.     Patient has no known allergies.    Past  Medical History:   Diagnosis Date   • Anemia    • Marsh's esophagus    • Basal cell carcinoma of skin     surgical removal   • Diaphragmatic hernia    • Diverticulitis    • GERD (gastroesophageal reflux disease)    • Hypertension    • Kidney stones    • Prostatitis    • Pulmonary fibrosis (HCC)        Social History     Socioeconomic History   • Marital status:    Tobacco Use   • Smoking status: Never   • Smokeless tobacco: Never   Vaping Use   • Vaping Use: Never used   Substance and Sexual Activity   • Alcohol use: No   • Drug use: No   • Sexual activity: Defer     Family History   Problem Relation Age of Onset   • Stroke Mother      ROS     Objective     BP 90/60 (BP Location: Right arm)   Pulse 68     Physical Exam     Procedures          Problem List Items Addressed This Visit        Cardiac and Vasculature    Hyperlipemia    Relevant Medications    atorvastatin (LIPITOR) 10 MG tablet   Other Visit Diagnoses     Essential hypertension        Relevant Medications    furosemide (LASIX) 20 MG tablet    carvedilol (COREG) 3.125 MG tablet    IHD (ischemic heart disease)        Relevant Medications    carvedilol (COREG) 3.125 MG tablet    clopidogrel (PLAVIX) 75 MG tablet         1.  CAD-stable, no angina. S/p stenting x 2 LCX, 2016. Chronically occluded LAD. Continue aspirin, Plavix, statin, BB, long-acting nitrate.      2. HTN- now hypotensive. Advised to hold Lasix x 1 week. Then use as needed. Monitor bp. If hypotension persists, will stop losartan.      3.  Hyperlipidemia-LDL/HDL 40/39.  Lipids well controlled with Lipitor.  Continue the same.        4.  Pulmonary fibrosis/PHTN-echo stable. CTA chest with significant interstitial fibrosis. CXR showed advanced fibrosis. He does not wear O2 or CPAP.  Sats normal at rest.      5. Weight loss/weakness- Remeron added. Encouraged to increase protein intake.      BMI is >= 25 and <30. (Overweight) The following options were offered after discussion;:  nutrition counseling/recommendations            Electronically signed by BRAULIO Henderson,  December 4, 2022 21:27 EST